# Patient Record
Sex: MALE | Employment: UNEMPLOYED | URBAN - METROPOLITAN AREA
[De-identification: names, ages, dates, MRNs, and addresses within clinical notes are randomized per-mention and may not be internally consistent; named-entity substitution may affect disease eponyms.]

---

## 2021-12-14 ENCOUNTER — HOSPITAL ENCOUNTER (INPATIENT)
Age: 31
LOS: 6 days | Discharge: HOME | DRG: 753 | End: 2021-12-20
Attending: EMERGENCY MEDICINE | Admitting: PSYCHIATRY & NEUROLOGY
Payer: MEDICARE

## 2021-12-14 DIAGNOSIS — R45.851 DEPRESSION WITH SUICIDAL IDEATION: Primary | ICD-10-CM

## 2021-12-14 DIAGNOSIS — F32.A DEPRESSION WITH SUICIDAL IDEATION: Primary | ICD-10-CM

## 2021-12-14 PROBLEM — F23 ACUTE PSYCHOSIS (HCC): Status: ACTIVE | Noted: 2021-12-14

## 2021-12-14 LAB
ABSOLUTE EOS #: 0.1 K/UL (ref 0–0.4)
ABSOLUTE IMMATURE GRANULOCYTE: ABNORMAL K/UL (ref 0–0.3)
ABSOLUTE LYMPH #: 1.8 K/UL (ref 1–4.8)
ABSOLUTE MONO #: 1.1 K/UL (ref 0.1–1.3)
ACETAMINOPHEN LEVEL: <5 UG/ML (ref 10–30)
ALBUMIN SERPL-MCNC: 4.3 G/DL (ref 3.5–5.2)
ALBUMIN/GLOBULIN RATIO: ABNORMAL (ref 1–2.5)
ALP BLD-CCNC: 69 U/L (ref 40–129)
ALT SERPL-CCNC: 76 U/L (ref 5–41)
AMPHETAMINE SCREEN URINE: POSITIVE
ANION GAP SERPL CALCULATED.3IONS-SCNC: 12 MMOL/L (ref 9–17)
AST SERPL-CCNC: 49 U/L
BARBITURATE SCREEN URINE: NEGATIVE
BASOPHILS # BLD: 1 % (ref 0–2)
BASOPHILS ABSOLUTE: 0 K/UL (ref 0–0.2)
BENZODIAZEPINE SCREEN, URINE: NEGATIVE
BILIRUB SERPL-MCNC: 0.76 MG/DL (ref 0.3–1.2)
BUN BLDV-MCNC: 13 MG/DL (ref 6–20)
BUN/CREAT BLD: ABNORMAL (ref 9–20)
BUPRENORPHINE URINE: ABNORMAL
CALCIUM SERPL-MCNC: 9 MG/DL (ref 8.6–10.4)
CANNABINOID SCREEN URINE: NEGATIVE
CHLORIDE BLD-SCNC: 100 MMOL/L (ref 98–107)
CO2: 24 MMOL/L (ref 20–31)
COCAINE METABOLITE, URINE: NEGATIVE
CREAT SERPL-MCNC: 0.76 MG/DL (ref 0.7–1.2)
DIFFERENTIAL TYPE: ABNORMAL
EOSINOPHILS RELATIVE PERCENT: 2 % (ref 0–4)
ETHANOL PERCENT: <0.01 %
ETHANOL: <10 MG/DL
GFR AFRICAN AMERICAN: >60 ML/MIN
GFR NON-AFRICAN AMERICAN: >60 ML/MIN
GFR SERPL CREATININE-BSD FRML MDRD: ABNORMAL ML/MIN/{1.73_M2}
GFR SERPL CREATININE-BSD FRML MDRD: ABNORMAL ML/MIN/{1.73_M2}
GLUCOSE BLD-MCNC: 106 MG/DL (ref 70–99)
HCT VFR BLD CALC: 41.8 % (ref 41–53)
HEMOGLOBIN: 14.8 G/DL (ref 13.5–17.5)
IMMATURE GRANULOCYTES: ABNORMAL %
LYMPHOCYTES # BLD: 23 % (ref 24–44)
MCH RBC QN AUTO: 29.6 PG (ref 26–34)
MCHC RBC AUTO-ENTMCNC: 35.4 G/DL (ref 31–37)
MCV RBC AUTO: 83.5 FL (ref 80–100)
MDMA URINE: ABNORMAL
METHADONE SCREEN, URINE: NEGATIVE
METHAMPHETAMINE, URINE: ABNORMAL
MONOCYTES # BLD: 15 % (ref 1–7)
NRBC AUTOMATED: ABNORMAL PER 100 WBC
OPIATES, URINE: NEGATIVE
OXYCODONE SCREEN URINE: NEGATIVE
PDW BLD-RTO: 13.1 % (ref 11.5–14.9)
PHENCYCLIDINE, URINE: NEGATIVE
PLATELET # BLD: 188 K/UL (ref 150–450)
PLATELET ESTIMATE: ABNORMAL
PMV BLD AUTO: 8.1 FL (ref 6–12)
POTASSIUM SERPL-SCNC: 3.7 MMOL/L (ref 3.7–5.3)
PROPOXYPHENE, URINE: ABNORMAL
RBC # BLD: 5.01 M/UL (ref 4.5–5.9)
RBC # BLD: ABNORMAL 10*6/UL
SALICYLATE LEVEL: <1 MG/DL (ref 3–10)
SARS-COV-2, RAPID: NOT DETECTED
SEG NEUTROPHILS: 59 % (ref 36–66)
SEGMENTED NEUTROPHILS ABSOLUTE COUNT: 4.6 K/UL (ref 1.3–9.1)
SODIUM BLD-SCNC: 136 MMOL/L (ref 135–144)
SPECIMEN DESCRIPTION: NORMAL
TEST INFORMATION: ABNORMAL
TOTAL PROTEIN: 7.2 G/DL (ref 6.4–8.3)
TRICYCLIC ANTIDEPRESSANTS, UR: ABNORMAL
WBC # BLD: 7.6 K/UL (ref 3.5–11)
WBC # BLD: ABNORMAL 10*3/UL

## 2021-12-14 PROCEDURE — 36415 COLL VENOUS BLD VENIPUNCTURE: CPT

## 2021-12-14 PROCEDURE — 80179 DRUG ASSAY SALICYLATE: CPT

## 2021-12-14 PROCEDURE — 87635 SARS-COV-2 COVID-19 AMP PRB: CPT

## 2021-12-14 PROCEDURE — 80307 DRUG TEST PRSMV CHEM ANLYZR: CPT

## 2021-12-14 PROCEDURE — 85025 COMPLETE CBC W/AUTO DIFF WBC: CPT

## 2021-12-14 PROCEDURE — 80053 COMPREHEN METABOLIC PANEL: CPT

## 2021-12-14 PROCEDURE — G0480 DRUG TEST DEF 1-7 CLASSES: HCPCS

## 2021-12-14 PROCEDURE — 99283 EMERGENCY DEPT VISIT LOW MDM: CPT

## 2021-12-14 PROCEDURE — 80143 DRUG ASSAY ACETAMINOPHEN: CPT

## 2021-12-14 PROCEDURE — 1240000000 HC EMOTIONAL WELLNESS R&B

## 2021-12-14 RX ORDER — POLYETHYLENE GLYCOL 3350 17 G/17G
17 POWDER, FOR SOLUTION ORAL DAILY PRN
Status: DISCONTINUED | OUTPATIENT
Start: 2021-12-14 | End: 2021-12-20 | Stop reason: HOSPADM

## 2021-12-14 RX ORDER — TRAZODONE HYDROCHLORIDE 50 MG/1
50 TABLET ORAL NIGHTLY PRN
Status: DISCONTINUED | OUTPATIENT
Start: 2021-12-15 | End: 2021-12-20 | Stop reason: HOSPADM

## 2021-12-14 RX ORDER — ACETAMINOPHEN 325 MG/1
650 TABLET ORAL EVERY 4 HOURS PRN
Status: DISCONTINUED | OUTPATIENT
Start: 2021-12-14 | End: 2021-12-20 | Stop reason: HOSPADM

## 2021-12-14 RX ORDER — LORAZEPAM 2 MG/ML
2 INJECTION INTRAMUSCULAR EVERY 4 HOURS PRN
Status: DISCONTINUED | OUTPATIENT
Start: 2021-12-14 | End: 2021-12-20 | Stop reason: HOSPADM

## 2021-12-14 RX ORDER — LORAZEPAM 1 MG/1
2 TABLET ORAL EVERY 4 HOURS PRN
Status: DISCONTINUED | OUTPATIENT
Start: 2021-12-14 | End: 2021-12-20 | Stop reason: HOSPADM

## 2021-12-14 RX ORDER — DIPHENHYDRAMINE HYDROCHLORIDE 50 MG/ML
50 INJECTION INTRAMUSCULAR; INTRAVENOUS EVERY 4 HOURS PRN
Status: DISCONTINUED | OUTPATIENT
Start: 2021-12-14 | End: 2021-12-20 | Stop reason: HOSPADM

## 2021-12-14 RX ORDER — HALOPERIDOL 5 MG/ML
5 INJECTION INTRAMUSCULAR EVERY 4 HOURS PRN
Status: DISCONTINUED | OUTPATIENT
Start: 2021-12-14 | End: 2021-12-20 | Stop reason: HOSPADM

## 2021-12-14 RX ORDER — HYDROXYZINE 50 MG/1
50 TABLET, FILM COATED ORAL 3 TIMES DAILY PRN
Status: DISCONTINUED | OUTPATIENT
Start: 2021-12-14 | End: 2021-12-20 | Stop reason: HOSPADM

## 2021-12-14 RX ORDER — HALOPERIDOL 5 MG
5 TABLET ORAL EVERY 4 HOURS PRN
Status: DISCONTINUED | OUTPATIENT
Start: 2021-12-14 | End: 2021-12-20 | Stop reason: HOSPADM

## 2021-12-14 RX ORDER — IBUPROFEN 400 MG/1
400 TABLET ORAL EVERY 6 HOURS PRN
Status: DISCONTINUED | OUTPATIENT
Start: 2021-12-14 | End: 2021-12-20 | Stop reason: HOSPADM

## 2021-12-14 RX ORDER — MAGNESIUM HYDROXIDE/ALUMINUM HYDROXICE/SIMETHICONE 120; 1200; 1200 MG/30ML; MG/30ML; MG/30ML
30 SUSPENSION ORAL EVERY 6 HOURS PRN
Status: DISCONTINUED | OUTPATIENT
Start: 2021-12-14 | End: 2021-12-20 | Stop reason: HOSPADM

## 2021-12-14 ASSESSMENT — ENCOUNTER SYMPTOMS
VOMITING: 0
NAUSEA: 0
ABDOMINAL PAIN: 0
BACK PAIN: 0
COUGH: 0

## 2021-12-14 ASSESSMENT — SLEEP AND FATIGUE QUESTIONNAIRES
DIFFICULTY FALLING ASLEEP: YES
DO YOU HAVE DIFFICULTY SLEEPING: YES
RESTFUL SLEEP: NO
DIFFICULTY STAYING ASLEEP: YES
AVERAGE NUMBER OF SLEEP HOURS: 0
DO YOU USE A SLEEP AID: NO
DIFFICULTY ARISING: YES
SLEEP PATTERN: DIFFICULTY FALLING ASLEEP

## 2021-12-14 ASSESSMENT — PATIENT HEALTH QUESTIONNAIRE - PHQ9: SUM OF ALL RESPONSES TO PHQ QUESTIONS 1-9: 9

## 2021-12-14 ASSESSMENT — LIFESTYLE VARIABLES: HISTORY_ALCOHOL_USE: YES

## 2021-12-15 PROBLEM — F43.10 PTSD (POST-TRAUMATIC STRESS DISORDER): Status: ACTIVE | Noted: 2021-12-15

## 2021-12-15 PROBLEM — F19.10 POLYSUBSTANCE ABUSE (HCC): Status: ACTIVE | Noted: 2021-12-15

## 2021-12-15 PROCEDURE — 1240000000 HC EMOTIONAL WELLNESS R&B

## 2021-12-15 PROCEDURE — 6370000000 HC RX 637 (ALT 250 FOR IP): Performed by: PSYCHIATRY & NEUROLOGY

## 2021-12-15 PROCEDURE — 6370000000 HC RX 637 (ALT 250 FOR IP): Performed by: NURSE PRACTITIONER

## 2021-12-15 PROCEDURE — 99223 1ST HOSP IP/OBS HIGH 75: CPT | Performed by: PSYCHIATRY & NEUROLOGY

## 2021-12-15 RX ORDER — LOPERAMIDE HYDROCHLORIDE 2 MG/1
2 CAPSULE ORAL 4 TIMES DAILY PRN
Status: DISCONTINUED | OUTPATIENT
Start: 2021-12-15 | End: 2021-12-20 | Stop reason: HOSPADM

## 2021-12-15 RX ORDER — TIZANIDINE 4 MG/1
4 TABLET ORAL EVERY 8 HOURS PRN
Status: DISCONTINUED | OUTPATIENT
Start: 2021-12-15 | End: 2021-12-20 | Stop reason: HOSPADM

## 2021-12-15 RX ORDER — CLONIDINE HYDROCHLORIDE 0.1 MG/1
0.1 TABLET ORAL 3 TIMES DAILY PRN
Status: DISCONTINUED | OUTPATIENT
Start: 2021-12-15 | End: 2021-12-20 | Stop reason: HOSPADM

## 2021-12-15 RX ORDER — OLANZAPINE 10 MG/1
10 TABLET ORAL NIGHTLY
Status: DISCONTINUED | OUTPATIENT
Start: 2021-12-15 | End: 2021-12-18

## 2021-12-15 RX ORDER — ONDANSETRON 4 MG/1
4 TABLET, ORALLY DISINTEGRATING ORAL EVERY 8 HOURS PRN
Status: DISCONTINUED | OUTPATIENT
Start: 2021-12-15 | End: 2021-12-20 | Stop reason: HOSPADM

## 2021-12-15 RX ORDER — DICYCLOMINE HYDROCHLORIDE 10 MG/1
10 CAPSULE ORAL 3 TIMES DAILY PRN
Status: DISCONTINUED | OUTPATIENT
Start: 2021-12-15 | End: 2021-12-20 | Stop reason: HOSPADM

## 2021-12-15 RX ADMIN — TIZANIDINE 4 MG: 4 TABLET ORAL at 20:58

## 2021-12-15 RX ADMIN — IBUPROFEN 400 MG: 400 TABLET, FILM COATED ORAL at 16:10

## 2021-12-15 RX ADMIN — CLONIDINE HYDROCHLORIDE 0.1 MG: 0.1 TABLET ORAL at 16:10

## 2021-12-15 RX ADMIN — TRAZODONE HYDROCHLORIDE 50 MG: 50 TABLET ORAL at 20:58

## 2021-12-15 RX ADMIN — HYDROXYZINE HYDROCHLORIDE 50 MG: 50 TABLET, FILM COATED ORAL at 08:50

## 2021-12-15 RX ADMIN — NICOTINE POLACRILEX 2 MG: 2 GUM, CHEWING BUCCAL at 21:49

## 2021-12-15 RX ADMIN — OLANZAPINE 10 MG: 10 TABLET, FILM COATED ORAL at 20:58

## 2021-12-15 RX ADMIN — IBUPROFEN 400 MG: 400 TABLET, FILM COATED ORAL at 08:49

## 2021-12-15 RX ADMIN — DICYCLOMINE HYDROCHLORIDE 10 MG: 10 CAPSULE ORAL at 20:58

## 2021-12-15 RX ADMIN — HYDROXYZINE HYDROCHLORIDE 50 MG: 50 TABLET, FILM COATED ORAL at 20:58

## 2021-12-15 ASSESSMENT — PAIN SCALES - GENERAL
PAINLEVEL_OUTOF10: 9
PAINLEVEL_OUTOF10: 9

## 2021-12-15 ASSESSMENT — PAIN DESCRIPTION - LOCATION: LOCATION: ARM

## 2021-12-15 ASSESSMENT — LIFESTYLE VARIABLES: HISTORY_ALCOHOL_USE: YES

## 2021-12-15 ASSESSMENT — PAIN DESCRIPTION - DESCRIPTORS: DESCRIPTORS: ACHING

## 2021-12-15 ASSESSMENT — PAIN DESCRIPTION - PAIN TYPE: TYPE: ACUTE PAIN

## 2021-12-15 NOTE — PLAN OF CARE
Problem: Tobacco Use:  Goal: Inpatient tobacco use cessation counseling participation  Description: Inpatient tobacco use cessation counseling participation  12/15/2021 1704 by Renee Grimaldo LPN  Outcome: Ongoing   Patient has Nicorette Gum to control tobacco cravings   Problem: Altered Mood, Depressive Behavior:  Goal: Ability to disclose and discuss suicidal ideas will improve  Description: Ability to disclose and discuss suicidal ideas will improve  12/15/2021 1704 by Renee Grimaldo LPN  Outcome: Ongoing   [facility] is seen in his room, affect is flat reports fleeting suicidal thoughts but agrees to be safe on the unit. Reports negative thoughts, and some anxiety and depression. Complains of withdrawal symptoms, tremors, hot/cold.  Took meds

## 2021-12-15 NOTE — BH NOTE
[facility]  Admission Note     Admission Type:   Admission Type: Voluntary    Reason for admission:  Reason for Admission: Patient states he has suicidal ideations with no plan, auditory hallucinations commanding to do thing but will not expand on this. Has not slept in a week. PATIENT STRENGTHS:  Strengths: Communication, No significant Physical Illness, Motivated    Patient Strengths and Limitations:  Limitations: Inappropriate/potentially harmful leisure interests    Addictive Behavior:   Addictive Behavior  In the past 3 months, have you felt or has someone told you that you have a problem with:  : None  Do you have a history of Chemical Use?: Yes  Do you have a history of Alcohol Use?: Yes  Do you have a history of Street Drug Abuse?: No  Histroy of Prescripton Drug Abuse?: No    Medical Problems:   Past Medical History:   Diagnosis Date    B[facility] disorder ([facility]     Depression     [facility]        Status EXAM:  Status and Exam  Normal: No  Facial Expression: Flat  Affect: Blunt  Level of Consciousness: Alert  Mood:Normal: No  Mood: Depressed, Anxious  Motor Activity:Normal: Yes  Interview Behavior: Cooperative  Preception: [place] to Person, [place] to Time, [place] to Place, [place] to Situation  Attention:Normal: No  Attention: Distractible  Thought Processes: Circumstantial  Thought Content:Normal: No  Thought Content: Preoccupations  Hallucinations:  Auditory (Comment)  Delusions: No  Memory:Normal: No  Memory: Poor Recent, Poor Remote  Insight and Judgment: No  Insight and Judgment: Poor Judgment, Poor Insight  Present Suicidal Ideation: Yes (no plan, contracts)  Present Homicidal Ideation: No    Tobacco Screening:  Practical Counseling, on admission, gabriela X, if applicable and completed (first 3 are required if patient doesn't refuse):            ( )  Recognizing danger situations (included triggers and roadblocks)                    ( )  Coping skills (new ways to manage stress, exercise, relaxation techniques, changing routine, distraction)                                                           ( )  Basic information about quitting (benefits of quitting, techniques in how to quit, available resources  ( ) Referral for counseling faxed to [facility]                                           ( ) Patient refused counseling  ( ) Patient has not smoked in the last 30 days    Metabolic Screening:    No results found for: LABA1C    No results found for: CHOL  No results found for: TRIG  No results found for: HDL  No components found for: LDLCAL  No results found for: LABVLDL      Body mass index is 27.12 kg/m². BP Readings from Last 2 Encounters:   12/14/21 132/84           Pt admitted with followings belongings:  Dental Appliances: None  Vision - Corrective Lenses: Glasses  Hearing Aid: None  Jewelry: None  Body Piercings Removed: N/A  Clothing: Footwear, Pants, Shirt, Socks  Were All Patient Medications Collected?: Not Applicable  Other Valuables: Other (Comment)     . Patient oriented to surroundings and program expectations and copy of patient rights given. Received admission packet. Consents reviewed, signed . Patient verbalized understanding. Patient education on precautions. Patient states suicidal ideations with no plan, contracts for safety. State auditory hallucinations but did not want to go into detail. States he has not slept in a week, has been using \"everything, meth, cocaine\". States he drinks \"maybe two beers every so often\". 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 States he has felt overwhelmed since he was released from [facility] two weeks ago. Doesn't know where to go as he cannot go back home. Patient reports poor supper system. Has not been on medications in two years. Patient admits to having PTSD from being in [facility].                    Cynthia Braswell RN

## 2021-12-15 NOTE — GROUP NOTE
Group Therapy Note    Date: 12/15/2021    Group Start Time: 1000  Group End Time: 1030  Group Topic: Psychotherapy    MANAN Brown        Group Therapy Note           Patient refused to attend psychotherapy group after encouragement from staff. 1:1 talk time offered but refused. PSignature:   Libby Brown

## 2021-12-15 NOTE — ED PROVIDER NOTES
[address]      Pt Name: Guanaco London  MRN: [id number]  [place] 1990  Date of evaluation: 12/14/21      CHIEF COMPLAINT       Chief Complaint   Patient presents with    Suicidal     pt transported to [place] [address] at arrival of unit. HISTORY OF PRESENT ILLNESS   HPI 28 y.o. male presents with c/o suicidal thoughts. The patient reports he was brought to the emergency department by a friend. The patient reports feeling depressed and having thought of suicide for the last month since he got out of [facility]. The patient has been thinking of overdosing on drugs. The patient reports a h/o of previous suicide attempt. The patient reports that their symptoms were provoked by difficulties after getting out of [facility] (reports he was in [facility] for the last 4 years) and being off his psychiatric medications. The patient does have a h/o of previous psychiatric admission. The patient reports drug use, meth and \"anything I can get my hands on\". He has attempted to overdose, \"but I keep waking up\". Last attempt was 2 days ago. REVIEW OF SYSTEMS     Review of Systems   Constitutional: Negative for fever. HENT: Negative for congestion. Eyes: Negative for visual disturbance. Respiratory: Negative for cough. Cardiovascular: Negative for chest pain. Gastrointestinal: Negative for abdominal pain, nausea and vomiting. Genitourinary: Negative for difficulty urinating. Musculoskeletal: Negative for back pain. Skin: Negative for rash. Neurological: Negative for headaches. Hematological: Negative for adenopathy. Psychiatric/Behavioral: Positive for dysphoric mood, sleep disturbance and suicidal ideas. PAST MEDICAL HISTORY     Past Medical History:   Diagnosis Date    [facility]     Depression     [facility]        SURGICAL HISTORY     History reviewed. No pertinent surgical history.     CURRENT MEDICATIONS       Current Discharge Medication List      CONTINUE these medications which have NOT CHANGED    Details   OLANZapine (ZYPREXA) 10 MG tablet Take 1 tablet by mouth nightly  Qty: 30 tablet, Refills: 0             ALLERGIES     has No Known Allergies. FAMILY HISTORY     He indicated that the status of his father is unknown. He indicated that the status of his other is unknown. SOCIAL HISTORY      reports that he has been smoking. His smokeless tobacco use includes chew. He reports current alcohol use. He reports current drug use. Drugs: IV and Marijuana ([facility]). PHYSICAL EXAM     INITIAL VITALS: /84   Pulse 82   Temp 98 °F (36.7 °C) (Oral)   Resp 14   Ht 6' (1.829 m)   Wt 200 lb (90.7 kg)   SpO2 100%   BMI 27.12 kg/m²   Gen: Tearful  Head: Normocephalic, atraumatic  Eye: Pupils equal round reactive to light, no conjunctivitis  Heart: Regular rate and rhythm no murmurs  Lungs: Clear to auscultation bilaterally, no respiratory distress  Chest wall: No crepitus, no tenderness palpation  Abdomen: Soft, nontender, nondistended, with no peritoneal signs  Skin: No diaphoresis. no lacerations. Neurologic: Patient is alert and oriented x3, motor and sensation is intact in all 4 extremities, speech is fluent  Extremities: Full range of motion, no cyanosis, no edema, no signs of trauma, no tenderness to palpation    MEDICAL DECISION MAKING:     MDM    28 y.o. male with depression, suicidal thoughts, suicidal plan. previous suicide attempt. The patient does not appear intoxicated. The patient is showing no signs of any acute active toxidrome. We'll screen for any acetaminophen or salicylate ingestion, we'll check baseline renal function, liver function, a drug screen, cbc and reassess. Emergency Department course:    Laboratory studies reviewed and unremarkable. Patient is medically clear for psychiatric evaluation.    will discuss the case with the psychiatry service, anticipate inpatient psychiatric admission. DIAGNOSTIC RESULTS     LABS: All lab results were reviewed by myself, and all abnormals are listed below.   Labs Reviewed   CBC WITH AUTO DIFFERENTIAL - Abnormal; Notable for the following components:       Result Value    Lymphocytes 23 (*)     Monocytes 15 (*)     All other components within normal limits   COMPREHENSIVE METABOLIC PANEL - Abnormal; Notable for the following components:    Glucose 106 (*)     ALT 76 (*)     AST 49 (*)     All other components within normal limits   URINE DRUG SCREEN - Abnormal; Notable for the following components:    Amphetamine Screen, Ur POSITIVE (*)     All other components within normal limits   ACETAMINOPHEN LEVEL - Abnormal; Notable for the following components:    Acetaminophen Level <5 (*)     All other components within normal limits   SALICYLATE LEVEL - Abnormal; Notable for the following components:    Salicylate Lvl <1 (*)     All other components within normal limits   COVID-19, RAPID   ETHANOL       EMERGENCY DEPARTMENT COURSE:   Vitals:    Vitals:    12/14/21 2005 12/14/21 2324   BP: 126/79 132/84   Pulse: 110 82   Resp: 19 14   Temp: 98.1 °F (36.7 °C) 98 °F (36.7 °C)   TempSrc:  Oral   SpO2: 97% 100%   Weight: 200 lb (90.7 kg) 200 lb (90.7 kg)   Height: 6' (1.829 m) 6' (1.829 m)       The patient was given the following medications while in the emergency department:  Orders Placed This Encounter   Medications    acetaminophen (TYLENOL) tablet 650 mg    aluminum & magnesium hydroxide-simethicone (MAALOX) 200-200-20 MG/5ML suspension 30 mL    hydrOXYzine (ATARAX) tablet 50 mg    ibuprofen (ADVIL;MOTRIN) tablet 400 mg    nicotine polacrilex (NICORETTE) gum 2 mg    polyethylene glycol (GLYCOLAX) packet 17 g    traZODone (DESYREL) tablet 50 mg    AND Linked Order Group     haloperidol lactate (HALDOL) injection 5 mg     LORazepam (ATIVAN) injection 2 mg     diphenhydrAMINE (BENADRYL) injection 50 mg    AND Linked Order Group     haloperidol (HALDOL) tablet 5 mg     LORazepam (ATIVAN) tablet 2 mg    influenza quadrivalent split vaccine (FLUZONE;FLUARIX;FLULAVAL;AFLURIA) injection 0.5 mL     -------------------------  CRITICAL CARE:   CONSULTS: None  PROCEDURES: Procedures     FINAL IMPRESSION      1. Depression with suicidal ideation          DISPOSITION/PLAN   DISPOSITION Decision To Admit 12/15/2021 06:27:36 AM      PATIENT REFERRED TO:  No follow-up provider specified.     DISCHARGE MEDICATIONS:  Current Discharge Medication List            Caesar Rm MD  Attending Emergency Physician                     Caesar Rm MD  12/15/21 4787

## 2021-12-15 NOTE — PROGRESS NOTES
Behavioral Services  Medicare Certification Upon Admission    I certify that this patient's inpatient psychiatric hospital admission is medically necessary for:    [x] (1) Treatment which could reasonably be expected to improve this patient's condition,       [x] (2) Or for diagnostic study;     AND     [x](2) The inpatient psychiatric services are provided while the individual is under the care of a physician and are included in the individualized plan of care.     Estimated length of stay/service 5 to 9 days    Plan for post-hospital care -outpatient care    Electronically signed by Alla Agarwal MD on 12/15/2021 at 11:13 AM

## 2021-12-15 NOTE — GROUP NOTE
Group Therapy Note    Date: 12/15/2021    Group Start Time: 1100  Group End Time: 1130  Group Topic: Cognitive Skills    MANAN Nieto, CTRS    Pt did not attend 1100 cognitive skills group d/t resting in room despite staff invitation to attend. 1:1 talk time offered as alternative to group session, pt declined.               Signature:  Giancarlo Moulton

## 2021-12-15 NOTE — PLAN OF CARE
[facility]  Initial Interdisciplinary Treatment Plan NO      Original treatment plan Date & Time: 12/15/2021 0748    Admission Type:  Admission Type: Voluntary    Reason for admission:   Reason for Admission: Patient states he has suicidal ideations with no plan, auditory hallucinations commanding to do thing but will not expand on this. Has not slept in a week. Estimated Length of Stay:  5-7days  Estimated Discharge Date: to be determined by physician    PATIENT STRENGTHS:  Patient Strengths:Strengths: Communication, No significant Physical Illness, Motivated  Patient Strengths and Limitations:Limitations: Inappropriate/potentially harmful leisure interests  Addictive Behavior: Addictive Behavior  In the past 3 months, have you felt or has someone told you that you have a problem with:  : None  Do you have a history of Chemical Use?: Yes  Do you have a history of Alcohol Use?: Yes  Do you have a history of Street Drug Abuse?: No  Histroy of Prescripton Drug Abuse?: No  Medical Problems:  Past Medical History:   Diagnosis Date    [facility]     Depression     Substance abuse ([facility]      Status EXAM:Status and Exam  Normal: No  Facial Expression: Flat  Affect: Blunt  Level of Consciousness: Alert  Mood:Normal: No  Mood: Depressed, Anxious  Motor Activity:Normal: Yes  Interview Behavior: Cooperative  Preception: [place] to Person, [place] Put[place] to Time, [place] to Place, [place] to Situation  Attention:Normal: No  Attention: Distractible  Thought Processes: Circumstantial  Thought Content:Normal: No  Thought Content: Preoccupations  Hallucinations:  Auditory (Comment)  Delusions: No  Memory:Normal: No  Memory: Poor Recent, Poor Remote  Insight and Judgment: No  Insight and Judgment: Poor Judgment, Poor Insight  Present Suicidal Ideation: Yes (no plan, contracts)  Present Homicidal Ideation: No    EDUCATION:   Learner Progress Toward Treatment Goals: reviewed group plans and strategies for care    Method:group therapy, medication compliance, individualized assessments and care planning    Outcome: needs reinforcement    PATIENT GOALS: to be discussed with patient within 72 hours    PLAN/TREATMENT RECOMMENDATIONS:     continue group therapy , medications compliance, goal setting, individualized assessments and care, continue to monitor pt on unit      SHORT-TERM GOALS:   Time frame for Short-Term Goals: 5-7 days    LONG-TERM GOALS:  Time frame for Long-Term Goals: 6 months  Members Present in Team Meeting: See [address], CTRS 12/15/2021+td

## 2021-12-15 NOTE — BH NOTE
On call provider, Dr. Deuce Polanco, notified of best practice advisory suggesting to place patient on suicide precautions. Provider to discontinue the order as patient does not meet criteria for suicide precautions at this time. Continue to observe patient on every 15 minute checks.

## 2021-12-15 NOTE — ED NOTES
Provisional Diagnosis:  Acute psychosis     Psychosocial and Contextual Factors: Pt has issues with social enviroment. Pt has issues with relationships. Pt has legal issues. Pt has substance abuse issues. C-SSRS Summary:    Patient: X    Family:     Agency: X (EPIC)    Present Suicidal Behavior:     Verbal: X    Attempt:     Past Suicidal Behavior:     Verbal: X    Attempt: X    Self- Injurious/ Self-Mutilation:  Pt denies    Trauma History:     Protective Factors: Pt has insurance. Pt has support. Risk Factors:Pt has poor coping skills. Pt has no housing. Substance Abuse: Meth and opiates. Clinical Summary:  Kathryne Holter is a 28year old male who presents to the ED via self. Pt is suicidal. Pt reports \"there are a million different ways. \" Pt reports pt has been trying to commit suicide over the past two weeks by intentionally overdosing on illegal drugs and walking out into traffic. Pt identifies being released from long-term to a [facility] house a month ago, not being able to return home, and having no where to go as the trigger to pt's SI. Pt has a history of suicidal ideations. Pt reports vague homicidal ideations without a plan or intent to harm anyone. Pt hears voices that tell pt to go \"good and bad things at times. \" Pt has a previous diagnosis of bipolar disorder and schizophrenia. Pt has been off medications for 2 years. Pt is not linked. Pt has no previous admissions to the [facility]. Pt has been abusing \"anyting I can get my hands on\" since pt was released from long-term. Pt used meth 2 days ago and \"opium\" around midnight. Pt reports a not sleeping for the past 2 days and a decrease in pt's appetite. Level of Care Disposition:.CHERIE consulted with Camilo Bhandari from psychiatry. Pt accepted for an inpatient admission to the [facility] for safety and stabilization.

## 2021-12-15 NOTE — BH NOTE
Patient request Atarax 50 mg po for increased anxiety, patient was cooperative with 1;1 talk time, currently resting in bed

## 2021-12-15 NOTE — CARE COORDINATION
[facility] Biopsychosocial Assessment    Current Level of Psychosocial Functioning     Independent   Dependent  X  Minimal Assist     Psychosocial High Risk Factors (check all that apply)    Unable to obtain meds   Chronic illness/pain    Substance abuse X Methamphetamines, Opiates   Lack of Family Support   Financial stress   Isolation X  Inadequate Community ResourcesX  Suicide attempt(s) X  Not taking medications X  Victim of crime   Developmental Delay  Unable to manage personal needs  X  Age 72 or older   Homeless   No transportation   Readmission within 30 days  Unemployment  Traumatic Event    Psychiatric Advanced Directives: none reported     Family to Involve in Treatment: Pt reports that his mother and father are supportive and involved in his care. Sexual Orientation: YAZ    Patient Strengths: insurance, family support     Patient Barriers: substance abuse, legal involvement, presenting on admission with suicidal ideation to Overdose on drugs. , not linked with [facility]. Opiate Education Provided: Pt was provided with Opiate addiction and relapse information. Pt reports recent Opiate and Methamphetamine use. Pt drug screen was positive for Methamphetamines upon admission. CMHC/mental health history: Pt is not currently linked with a [facility], He reports that he is not currently on any Psychiatric medications, Pt lives in Psychiatric hospital. He reports that he is currently on [place] after being released from a 4 year custodial sentence in [address] [place] [address]. He reports that he was [facility] [place], because he was told by his  that he is unable to return home. Plan of Care   medication management, group/individual therapies, family meetings, psycho -education, treatment team meetings to assist with stabilization, referral to community resources. Initial Discharge Plan:   To Be Determined, Pt is currently on a police [place] through [address] [place] [address] Summary:  Radha Cheng is a 28year old male who presents on admission with suicidal ideation with a plan to overdose. Pt reports he has been trying to commit suicide over the past two weeks by intentionally overdosing on illegal drugs and walking out into traffic. Pt identifies being released from [facility] to a senior living house, not being able to return home, and having no where to go as the trigger to pt's suicidal ideation. Pt has a history of suicidal ideations. \" Pt has a previous diagnosis of bipolar disorder and schizophrenia. Pt is not currently linked with a [facility], that he has been off has been off medications for 2 years. Pt lives in [facility]. He reports that he is currently on [place] after being released from a 4 year [facility] sentence in [place]. He reports that he was released to a senior living house, because he was told by his  that he is unable to return home. Pt reports that he used meth 2 days ago and \"opium\" around midnight. Pt reports a not sleeping for the past 2 days and a decrease in pt's appetite.

## 2021-12-15 NOTE — H&P
Department of Psychiatry  Attending Physician Psychiatric Assessment     Reason for Admission to Psychiatric Unit:    Threat to self requiring 24 hour professional observation  Command hallucinations directing harm to self or others; risk of the patient taking action  A mental disorder causing major disability in social, interpersonal, occupational, and/or educational functioning that is leading to dangerous or life-threatening functioning, and that can only be addressed in an acute inpatient setting   Concerns about patient's safety in the community    CHIEF COMPLAINT: Depression with suicidal ideation    History obtained from: Patient, electronic medical record          HISTORY OF PRESENT ILLNESS:    Asmita Bassett is a 28 y.o. male who has a past medical history of bipolar, depression, substance abuse. Patient presented to the [facility] ED with the report of suicidal ideation. According to ED documentation: The patient reports he was brought to the emergency department by a friend. The patient reports feeling depressed and having thought of suicide for the last month since he got out of custodial. The patient has been thinking of overdosing on drugs. The patient reports a h/o of previous suicide attempt. The patient reports that their symptoms were provoked by difficulties after getting out of custodial (reports he was in correction for the last 4 years) and being off his psychiatric medications. The patient does have a h/o of previous psychiatric admission. The patient reports drug use, meth and \"anything I can get my hands on\". He has attempted to overdose, \"but I keep waking up\". Last attempt was 2 days ago. Josafat Joann is interviewed today bedside, he endorses a low mood for greater than 2 weeks, difficulty sleeping, feelings of guilt and worthlessness, poor energy, poor focus and concentration, decreased appetite, feelings of hopelessness and suicidal thoughts.   He reports that he is attempted to overdose multiple times and also tried to hang himself since November 18. He reports that since being released from jail he walked away from the care home house after 2 days. He reports that this is a violation but that he \"did his time for that after being rearrested and released from snf on Tuesday night. He reports that just prior to this hospitalization he was in a car and states \"they tried to rhona me\", he reports that he jumped out of the vehicle and the car door slammed back on his left wrist.  He endorses pain and decreased range of motion to the left wrist.  In addition to his feelings of depression he endorses some anxiety and irritability. He reports that the irritability is mostly related to drug withdrawal.  He reports that he has not slept for approximately 1 week though reports that he has been consistently using methamphetamine and cocaine throughout that week. He does endorse a history of jono without the presence of illicit substances once in his adult life. He also endorses symptoms of psychosis, he reports auditory hallucinations, he reports that sometimes they are command in nature telling him to hurt himself and others though he denies that they are command at present. He endorses a history of paranoia and also feeling that the television is talking to or about him at times. He reports occasional panic though states that he normally \"uses drugs or rides it out\". Rosita Loaiza also endorses a history of trauma, he reports that he experienced \"a lot of really bad things in jail\". He reports that he has difficulty sleeping because of it, he endorses hypervigilance, he is easily startled and does not like being around crowds. He reports that his parents are supportive however he does not identify them as protective factors. He describes his withdrawal symptoms at present as \"my bones hurt and my skin is crawling\". He also endorses some nausea.          History of head trauma: [] Yes [x] No    History of seizures: [] Yes [x] No    History of violence or aggression: [x] Yes [] No         PSYCHIATRIC HISTORY:  [x] Yes [] No    Currently follows with no one, recently released from intermediate  Multiple lifetime suicide attempts, overdose and hanging  Multiple psychiatric hospital admissions, last [facility] admission was in 2016, at that time he did attempt to escape by jumping over the court yard wall    Home Medication Compliance: [] Yes [x] No    Past psychiatric medications includes: Olanzapine, Depakote, Seroquel, Prozac, Suboxone    Adverse reactions from psychotropic medications: [] Yes [x] No         Lifetime Psychiatric Review of Systems         Depression: Endorses     Anxiety: Endorses     Panic Attacks: Endorses     Dorie or Hypomania: Endorses history of     Phobias: Endorses (crowds)     Obsessions and Compulsions: Denies     Body or Vocal Tics: Denies     Visual Hallucinations: Denies     Auditory Hallucinations: Endorses     Delusions/Paranoia: Endorses     PTSD: Endorses    Past Medical History:        Diagnosis Date    [facility]     Depression     [facility]        Past Surgical History:    History reviewed. No pertinent surgical history. Allergies:  Patient has no known allergies. Social History:     Born in: [facility]  Family: Reports that he was raised by his biological parents and lived with 2 siblings. He reports that he has 2 sisters. He reports that his parents and sisters are supportive. He denies any abuse as a child.   Highest Level of Education: High school graduate  Occupation: Unemployed  Marital Status: Single  Children: None  Residence: Homeless  Stressors: Legal issues, drug addiction, unstable income, unstable housing  Patient Assets/Supportive Factors: Supportive family, believes SSI was approved prior to release from intermediate         [address] [place] [address] History     Tobacco Use   Smoking Status Current Every Day Smoker   Smokeless Tobacco Current User    Types: Chew   Tobacco Comment    refuses nicotine replacement     Social History     Substance and Sexual Activity   Alcohol Use Yes    Alcohol/week: 0.0 standard drinks    Comment: up to 12 Beers/days     Social History     Substance and Sexual Activity   Drug Use Yes    Types: IV, Marijuana (Weed)    Comment: Heroin       Endorses a long history of drug abuse. Reports recently that he has been trying to overdose on fentanyl, and using methamphetamine and cocaine. UDS is positive for amphetamine  EtOH level is less than 0.010         LEGAL HISTORY:   HISTORY OF INCARCERATION: [x] Yes [] No, currently on parole. Also has a police hold. He reports that he has spent a total of 10 years in halfway through 5 stents of incarceration and multiple local group home stays. He reports that it is drug related. Family History:       Problem Relation Age of Onset    Depression Other         uncle, commited suicide.  Alcohol Abuse Other         uncle    Alcohol Abuse Father        Psychiatric Family History  Patient endorses psychiatric family history. Maternal uncle was bipolar. Suicides in family: [] Yes [x] No    Substance use in family: [x] Yes [] No, maternal uncle fatally overdosed         PHYSICAL EXAM:  Vitals:  /65   Pulse 65   Temp 98 °F (36.7 °C) (Oral)   Resp 14   Ht 6' (1.829 m)   Wt 200 lb (90.7 kg)   SpO2 100%   BMI 27.12 kg/m²   Pain Level: 9/10    LABS:  Labs reviewed: [x] Yes  No recent EKG on file for review          Review of Systems   Constitutional: Endorses \"bone aches\"  HENT: Negative for ear pain and nosebleeds. Eyes: Negative for blurred vision and photophobia. Respiratory: Negative for cough, shortness of breath and wheezing. Cardiovascular: Negative for chest pain and palpitations. Gastrointestinal: Positive for nausea. Denies any abdominal pain or diarrhea. Genitourinary: Negative for dysuria and urgency.    Musculoskeletal: Positive left wrist pain secondary to injury. Skin: Negative for itching and rash. Positive \"skin crawling\"  Neurological: Negative for tremors, seizures and weakness. Endo/Heme/Allergies: Does not bruise/bleed easily. Physical Exam:   Constitutional:  Appears well-developed and well-nourished, no acute distress. HENT:   Head: Normocephalic and atraumatic. Eyes: Conjunctivae are normal. Right eye exhibits no discharge. Left eye exhibits no discharge. No scleral icterus. Neck: Normal range of motion. Neck supple. Pulmonary/Chest:  No respiratory distress or accessory muscle use, no wheezing. Cardiac: Regular rate and rhythm. Abdominal: Soft. Non-tender. Exhibits no distension. Musculoskeletal: Normal range of motion, with the exception of the left wrist.  No significant point tenderness. Exhibits no edema. Neurological: cranial nerves II-XII grossly in tact, normal gait and station. Skin: Skin is warm and dry. Patient is not diaphoretic. No erythema. Mental Status Examination:    Level of consciousness: Awake and alert  Appearance:  Appropriate attire, resting in bed, fair grooming   Behavior/Motor: Approachable, engages in interview  Attitude toward examiner:  Cooperative, attentive, poor eye contact  Speech: Normal rate, volume, and tone. Mood: Depressed  Affect:  Congruent  Thought processes:   Linear and coherent  Thought content: Active suicidal ideations, with a  current plan or intent, contracts for safety on the unit.                Denies homicidal ideations               Endorses auditory hallucinations              Denies delusions              Endorses paranoia  Cognition:  Oriented to self, location, time, situation  Concentration: Clinically adequate  Memory: Intact  Insight &Judgment: Poor         DSM-5 Diagnosis    Principal Problem: Bipolar disorder, curr episode depressed, severe, w/psychotic features ([facility]    PTSD  Polysubstance abuse    Psychosocial and Contextual factors:  Financial Occupational   Relationship   Legal   Living situation   Educational     Past Medical History:   Diagnosis Date    B[facility] disorder ([facility]     Depression     Substance abuse ([facility]         TREATMENT PLAN    Continue inpatient psychiatric treatment. Home medications reviewed. Start Zyprexa 10 mg nightly  Monitor for opiate withdrawal  Monitor need and frequency of PRN medications. Attempt to develop insight. Follow-up daily while inpatient. Reviewed risks and benefits as well as potential side effects with patient. CONSULTS [x] Yes [] No  Internal medicine for left wrist injury    Risk Management: close watch per standard protocol      Psychotherapy: participation in milieu and group and individual sessions with Attending Physician,  and Physician Assistant/CNP      Estimated length of stay:  2-14 days      GENERAL PATIENT/FAMILY EDUCATION  Patient will understand basic signs and symptoms, patient will understand benefits/risks and potential side effects from proposed medications, and patient will understand their role in recovery. Family is minimally active in patient's care. Patient assets that may be helpful during treatment include: Intent to participate and engage in treatment, sufficient fund of knowledge and intellect to understand and utilize treatments. Goals:    1) Remission of suicidal ideation. 2) Stabilization of symptoms prior to discharge. 3) Establish efficacy and tolerability of medications. Behavioral Services  Medicare Certification     Admission Day 1  I certify that this patient's inpatient psychiatric hospital admission is medically necessary for:    x (1) treatment which could reasonably be expected to improve this patient's condition, or    x (2) diagnostic study or its equivalent.      Time Spent: 60 minutes    Shilpi Silva is a 28 y.o. male being evaluated face to face    --JAZMINE Sterling CNP on 12/15/2021 at 3:38 PM    An electronic signature was used to authenticate this note. I independently saw and evaluated the patient. I reviewed the midlevel provider's documentation above. Any additional comments or changes to the midlevel provider's documentation are stated below otherwise agree with assessment. The patient was seen at bedside. He reports that he has been feeling suicidal for a while but has been getting worse. He also reports visual hallucinations and paranoia. He could not give a good description of his visual hallucinations. He believes they are like shadows but he cannot make out what they are. Next para the patient has been using methamphetamine. The patient has been admitted to psychiatry in the past and has attempted suicide before. He said this is his third admission. Patient has benefited from treatment from Zyprexa in the past and is willing to take this medication again. We will start Zyprexa 10 mg daily. PLAN  Medications as noted above  Attempt to develop insight  Psycho-education conducted. Supportive Therapy conducted. Probable discharge is 3-9 days.    Follow-up daily while on inpatient unit    Electronically signed by Tonja Carr MD on 12/15/21 at 5:44 PM EST

## 2021-12-15 NOTE — BH NOTE
Per staff report to this writer:   [facility] called and relayed that Mr. Melinda Yin has active warrants and is on parole. They have reportedly been looking for him and suspect he has been \"hospital hopping\" to avoid arrest. Reportedly  [address] [facility] [facility] contacted [facility] and notified them of his current location. Tanisha [place] Fernanda expressed interest in obtaining custody upon his discharge. Staff who took call transferred call to hospital security at the time of the call. Writer notified Social Work. [facility] contacted this writer and notified of new Azul status. Copy of Azul placed in Kasey Aruna chart.

## 2021-12-16 PROCEDURE — 6370000000 HC RX 637 (ALT 250 FOR IP): Performed by: NURSE PRACTITIONER

## 2021-12-16 PROCEDURE — 99232 SBSQ HOSP IP/OBS MODERATE 35: CPT | Performed by: PSYCHIATRY & NEUROLOGY

## 2021-12-16 PROCEDURE — APPSS30 APP SPLIT SHARED TIME 16-30 MINUTES: Performed by: NURSE PRACTITIONER

## 2021-12-16 PROCEDURE — 6360000002 HC RX W HCPCS: Performed by: PSYCHIATRY & NEUROLOGY

## 2021-12-16 PROCEDURE — 6370000000 HC RX 637 (ALT 250 FOR IP): Performed by: PSYCHIATRY & NEUROLOGY

## 2021-12-16 PROCEDURE — 1240000000 HC EMOTIONAL WELLNESS R&B

## 2021-12-16 RX ORDER — BUPRENORPHINE AND NALOXONE 2; .5 MG/1; MG/1
1 FILM, SOLUBLE BUCCAL; SUBLINGUAL 2 TIMES DAILY
Status: DISCONTINUED | OUTPATIENT
Start: 2021-12-16 | End: 2021-12-19

## 2021-12-16 RX ADMIN — BUPRENORPHINE AND NALOXONE 1 FILM: 2; .5 FILM BUCCAL; SUBLINGUAL at 14:38

## 2021-12-16 RX ADMIN — ONDANSETRON 4 MG: 4 TABLET, ORALLY DISINTEGRATING ORAL at 13:58

## 2021-12-16 RX ADMIN — DICYCLOMINE HYDROCHLORIDE 10 MG: 10 CAPSULE ORAL at 13:58

## 2021-12-16 RX ADMIN — TIZANIDINE 4 MG: 4 TABLET ORAL at 22:21

## 2021-12-16 RX ADMIN — OLANZAPINE 10 MG: 10 TABLET, FILM COATED ORAL at 20:19

## 2021-12-16 RX ADMIN — NICOTINE POLACRILEX 2 MG: 2 GUM, CHEWING BUCCAL at 20:57

## 2021-12-16 RX ADMIN — NICOTINE POLACRILEX 2 MG: 2 GUM, CHEWING BUCCAL at 11:19

## 2021-12-16 RX ADMIN — TIZANIDINE 4 MG: 4 TABLET ORAL at 13:59

## 2021-12-16 RX ADMIN — DICYCLOMINE HYDROCHLORIDE 10 MG: 10 CAPSULE ORAL at 20:19

## 2021-12-16 RX ADMIN — NICOTINE POLACRILEX 2 MG: 2 GUM, CHEWING BUCCAL at 13:56

## 2021-12-16 RX ADMIN — HYDROXYZINE HYDROCHLORIDE 50 MG: 50 TABLET, FILM COATED ORAL at 13:59

## 2021-12-16 RX ADMIN — CLONIDINE HYDROCHLORIDE 0.1 MG: 0.1 TABLET ORAL at 13:56

## 2021-12-16 RX ADMIN — BUPRENORPHINE AND NALOXONE 1 FILM: 2; .5 FILM BUCCAL; SUBLINGUAL at 20:19

## 2021-12-16 NOTE — PLAN OF CARE
[facility]  Day 3 Interdisciplinary Treatment Plan NOTE    Review Date & Time: 12/16/2021 7415    Admission Type:   Admission Type: Voluntary    Reason for admission:  Reason for Admission: Patient states he has suicidal ideations with no plan, auditory hallucinations commanding to do thing but will not expand on this. Has not slept in a week. Estimated Length of Stay: 5-7 days  Estimated Discharge Date Update: to be determined by physician    PATIENT STRENGTHS:  Patient Strengths Strengths: Communication, No significant Physical Illness, Motivated  Patient Strengths and Limitations:Limitations: Apathetic / unmotivated, Inappropriate/potentially harmful leisure interests, Multiple barriers to leisure interests, General negative or hopeless attitude about future/recovery  Addictive Behavior:Addictive Behavior  In the past 3 months, have you felt or has someone told you that you have a problem with:  : None  Do you have a history of Chemical Use?: Yes  Do you have a history of Alcohol Use?: Yes  Do you have a history of Street Drug Abuse?: Yes  Histroy of Prescripton Drug Abuse?: No  Medical Problems:  Past Medical History:   Diagnosis Date    Bipolar disorder ([facility] [address]     Depression     Substance abuse ([facility]        Risk:  Fall RiskTotal: 67  Avery Scale Avery Scale Score: 22  BVC    Change in scores no Changes to plan of Care no    Status EXAM:   Status and Exam  Normal: No  Facial Expression: Flat  Affect: Blunt  Level of Consciousness: Alert  Mood:Normal: No  Mood: Depressed, Anxious  Motor Activity:Normal: No  Motor Activity: Decreased  Interview Behavior: Cooperative  Preception: [place] to Person, [place] to Time, [place] to Place, [place] to Situation  Attention:Normal: No  Attention: Distractible  Thought Processes: Circumstantial  Thought Content:Normal: No  Thought Content: Preoccupations  Hallucinations:  Auditory (Comment)  Delusions: No  Memory:Normal: No  Memory: Poor Recent, Poor Remote  Insight and Judgment: No  Insight and Judgment: Poor Judgment, Poor Insight, Unmotivated  Present Suicidal Ideation: No  Present Homicidal Ideation: No    Daily Assessment Last Entry:   Daily Sleep (WDL): Within Defined Limits         Patient Currently in Pain: Denies  Daily Nutrition (WDL): Within Defined Limits    Patient Monitoring:  Frequency of Checks: 4 times per hour, close    Psychiatric Symptoms:   Depression Symptoms  Depression Symptoms: Loss of interest, Isolative  Anxiety Symptoms  Anxiety Symptoms: Generalized  Dorie Symptoms  Dorie Symptoms: No problems reported or observed. Psychosis Symptoms  Delusion Type: No problems reported or observed. Suicide Risk CSSR-S:  1) Within the past month, have you wished you were dead or wished you could go to sleep and not wake up? : Yes  2) Have you actually had any thoughts of killing yourself? : No  3) Have you been thinking about how you might kill yourself? : No  5) Have you started to work out or worked out the details of how to kill yourself?  Do you intend to carry out this plan? : No  6) Have you ever done anything, started to do anything, or prepared to do anything to end your life?: No  Change in Result no Change in Plan of care no      EDUCATION:   EDUCATION:   Learner Progress Toward Treatment Goals: Reviewed results and recommendations of this team, Reviewed group plan and strategies, Reviewed signs, symptoms and risk of self harm and violent behavior, Reviewed goals and plan of care    Method:small group, individual verbal education    Outcome:verbalized by patient, but needs reinforcement to obtain goals    PATIENT GOALS:  Short term: attending groups, med stabilization   Long term: get into rehab, get clean    PLAN/TREATMENT RECOMMENDATIONS UPDATE: continue with group therapies, increased socialization, continue planning for after discharge goals, continue with medication compliance    SHORT-TERM GOALS UPDATE:   Time frame for Short-Term Goals: 5-7 days    LONG-TERM GOALS UPDATE:   Time frame for Long-Term Goals: 6 months  Members Present in Team Meeting: See Signature Sheet    MAGGIE Cuellar

## 2021-12-16 NOTE — GROUP NOTE
Group Therapy Note    Date: 12/16/2021    Group Start Time: 5735  Group End Time: 9647  Group Topic: Recreational    [facility] D    Kitty Dk, CTRS    Pt did not attend [phone] recreation therapy group d/t resting in room despite staff invitation to attend. 1:1 talk time offered as alternative to group session, pt declined.             Signature:  Татьяна Lawson

## 2021-12-16 NOTE — PROGRESS NOTES
Daily Progress Note  12/16/2021    Patient Name: Yossi King: Depression with suicidal ideation         Emergency Medications: As required opiate withdrawal as needed clonidine 0.1 mg once today. Scheduled medications: Adherent    SUBJECTIVE:     Patient seen face-to-face for follow-up assessment. He is anxious. States that he is upset regarding requirement to discharge to an AOD program.  States \"I just cannot do it! I will kill myself if I have to go be with other drug addicts. I am going to get into a fight with one of them and go back to long term if I go back to one of those places. \"  States he would rather just go to long term and to serve his time. Denies specific plan to harm self on unit but is unable to contract for safety at a lower level of care. Provided emotional support. Attempted to engage patient in discussion regarding his agitation and coping skills that he can use when feeling on edge and explosive. Patient indifferent. States that nothing will be helpful. Patient was started on olanzapine 10 mg. Denies any side effects to this medication at this time. States that he felt he slept better overnight. Patient reports withdrawal symptoms from methamphetamine. He endorses dry mouth, increased anxiety and some episodes of jitteriness. Clonidine, Bentyl, hydroxyzine, Zofran, and Zanaflex available as needed for specific withdrawal symptoms.     Appetite:  [] Normal/Adequate/Unchanged  [x] Increased  [] Decreased      Sleep:       [] Normal/Adequate/Unchanged  [x] Fair  [] Poor      Group Attendance on Unit:   [] Yes  [] Selectively    [x] No    Medication Side Effects: Denies         Mental Status Exam  Level of consciousness: Alert and awake   Appearance: Appropriate attire for setting, seated in chair, with fair  grooming and hygiene   Behavior/Motor: Approachable, no psychomotor abnormalities   Attitude toward examiner: Cooperative, attentive, good eye contact Speech: spontaneous, normal rate, normal volume and well articulated   Mood:  \"anxious\"  Affect: Congruent  Thought processes: linear   Thought content: Denies homicidal ideation  Suicidal Ideation: Endorses suicidal ideation without a plan, able to contract for safety on unit  Delusions: Denies  Perceptual Disturbance: Denies. Does not appear to be responding to internal stimuli  Cognition: Oriented to self, location, time, and situation  Memory: intact  Insight & Judgement: poor     Data   height is 6' (1.829 m) and weight is 200 lb (90.7 kg). His oral temperature is 97.9 °F (36.6 °C). His blood pressure is 127/71 and his pulse is 60. His respiration is 14 and oxygen saturation is 100%.    Labs:   Admission on 12/14/2021   Component Date Value Ref Range Status    WBC 12/14/2021 7.6  3.5 - 11.0 k/uL Final    RBC 12/14/2021 5.01  4.5 - 5.9 m/uL Final    Hemoglobin 12/14/2021 14.8  13.5 - 17.5 g/dL Final    Hematocrit 12/14/2021 41.8  41 - 53 % Final    MCV 12/14/2021 83.5  80 - 100 fL Final    MCH 12/14/2021 29.6  26 - 34 pg Final    MCHC 12/14/2021 35.4  31 - 37 g/dL Final    RDW 12/14/2021 13.1  11.5 - 14.9 % Final    Platelets 37/93/1695 188  150 - 450 k/uL Final    MPV 12/14/2021 8.1  6.0 - 12.0 fL Final    NRBC Automated 12/14/2021 NOT REPORTED  per 100 WBC Final    Differential Type 12/14/2021 NOT REPORTED   Final    Seg Neutrophils 12/14/2021 59  36 - 66 % Final    Lymphocytes 12/14/2021 23* 24 - 44 % Final    Monocytes 12/14/2021 15* 1 - 7 % Final    Eosinophils % 12/14/2021 2  0 - 4 % Final    Basophils 12/14/2021 1  0 - 2 % Final    Immature Granulocytes 12/14/2021 NOT REPORTED  0 % Final    Segs Absolute 12/14/2021 4.60  1.3 - 9.1 k/uL Final    Absolute Lymph # 12/14/2021 1.80  1.0 - 4.8 k/uL Final    Absolute Mono # 12/14/2021 1.10  0.1 - 1.3 k/uL Final    Absolute Eos # 12/14/2021 0.10  0.0 - 0.4 k/uL Final    Basophils Absolute 12/14/2021 0.00  0.0 - 0.2 k/uL Final    Absolute Immature Granulocyte 12/14/2021 NOT REPORTED  0.00 - 0.30 k/uL Final    WBC Morphology 12/14/2021 NOT REPORTED   Final    RBC Morphology 12/14/2021 NOT REPORTED   Final    Platelet Estimate 17/22/3811 NOT REPORTED   Final    Glucose 12/14/2021 106* 70 - 99 mg/dL Final    BUN 12/14/2021 13  6 - 20 mg/dL Final    CREATININE 12/14/2021 0.76  0.70 - 1.20 mg/dL Final    Bun/Cre Ratio 12/14/2021 NOT REPORTED  9 - 20 Final    Calcium 12/14/2021 9.0  8.6 - 10.4 mg/dL Final    Sodium 12/14/2021 136  135 - 144 mmol/L Final    Potassium 12/14/2021 3.7  3.7 - 5.3 mmol/L Final    Chloride 12/14/2021 100  98 - 107 mmol/L Final    CO2 12/14/2021 24  20 - 31 mmol/L Final    Anion Gap 12/14/2021 12  9 - 17 mmol/L Final    Alkaline Phosphatase 12/14/2021 69  40 - 129 U/L Final    ALT 12/14/2021 76* 5 - 41 U/L Final    AST 12/14/2021 49* <40 U/L Final    Total Bilirubin 12/14/2021 0.76  0.3 - 1.2 mg/dL Final    Total Protein 12/14/2021 7.2  6.4 - 8.3 g/dL Final    Albumin 12/14/2021 4.3  3.5 - 5.2 g/dL Final    Albumin/Globulin Ratio 12/14/2021 NOT REPORTED  1.0 - 2.5 Final    GFR Non- 12/14/2021 >60  >60 mL/min Final    GFR  12/14/2021 >60  >60 mL/min Final    GFR Comment 12/14/2021        Final    Comment: Average GFR for 30-36 years old:   80 mL/min/1.73sq m  Chronic Kidney Disease:   <60 mL/min/1.73sq m  Kidney failure:   <15 mL/min/1.73sq m              eGFR calculated using average adult body mass.  Additional eGFR calculator available at:        [employer]            GFR Staging 12/14/2021 NOT REPORTED   Final    Ethanol 12/14/2021 <10  <10 mg/dL Final    Ethanol percent 12/14/2021 <0.010  % Final    Amphetamine Screen, Ur 12/14/2021 POSITIVE* NEGATIVE Final    Comment:       (Positive cutoff 1000 ng/mL)                  Barbiturate Screen, Ur 12/14/2021 NEGATIVE  NEGATIVE Final    Comment:       (Positive cutoff 200 ng/mL)  Benzodiazepine Screen, Urine 12/14/2021 NEGATIVE  NEGATIVE Final    Comment:       (Positive cutoff 200 ng/mL)                  Cocaine Metabolite, Urine 12/14/2021 NEGATIVE  NEGATIVE Final    Comment:       (Positive cutoff 300 ng/mL)                  Methadone Screen, Urine 12/14/2021 NEGATIVE  NEGATIVE Final    Comment:       (Positive cutoff 300 ng/mL)                  Opiates, Urine 12/14/2021 NEGATIVE  NEGATIVE Final    Comment:       (Positive cutoff 300 ng/mL)                  Phencyclidine, Urine 12/14/2021 NEGATIVE  NEGATIVE Final    Comment:       (Positive cutoff 25 ng/mL)                  Propoxyphene, Urine 12/14/2021 NOT REPORTED  NEGATIVE Final    Cannabinoid Scrn, Ur 12/14/2021 NEGATIVE  NEGATIVE Final    Comment:       (Positive cutoff 50 ng/mL)                  Oxycodone Screen, Ur 12/14/2021 NEGATIVE  NEGATIVE Final    Comment:       (Positive cutoff 100 ng/mL)                  Methamphetamine, Urine 12/14/2021 NOT REPORTED  NEGATIVE Final    Tricyclic Antidepressants, Urine 12/14/2021 NOT REPORTED  NEGATIVE Final    MDMA, Urine 12/14/2021 NOT REPORTED  NEGATIVE Final    Buprenorphine Urine 12/14/2021 NOT REPORTED  NEGATIVE Final    Test Information 12/14/2021 Assay provides medical screening only. The absence of expected drug(s) and/or metabolite(s) may indicate diluted or adulterated urine, limitations of testing or timing of collection. Final    Comment: Testing for legal purposes should be confirmed by another method. To request confirmation   of test result, please call the lab within 7 days of sample submission.  Acetaminophen Level 12/14/2021 <5* 10 - 30 ug/mL Final    Salicylate Lvl 80/54/2030 <1* 3 - 10 mg/dL Final    Specimen Description 12/14/2021 . NASOPHARYNGEAL SWAB   Final    SARS-CoV-2, Rapid 12/14/2021 Not Detected  Not Detected Final    Comment:       Rapid NAAT:  The specimen is NEGATIVE for SARS-CoV-2, the novel coronavirus associated with Daily PRN  traZODone (DESYREL) tablet 50 mg, 50 mg, Oral, Nightly PRN  haloperidol lactate (HALDOL) injection 5 mg, 5 mg, IntraMUSCular, Q4H PRN **AND** LORazepam (ATIVAN) injection 2 mg, 2 mg, IntraMUSCular, Q4H PRN **AND** diphenhydrAMINE (BENADRYL) injection 50 mg, 50 mg, IntraMUSCular, Q4H PRN  haloperidol (HALDOL) tablet 5 mg, 5 mg, Oral, Q4H PRN **AND** LORazepam (ATIVAN) tablet 2 mg, 2 mg, Oral, Q4H PRN  influenza quadrivalent split vaccine (FLUZONE;FLUARIX;FLULAVAL;AFLURIA) injection 0.5 mL, 0.5 mL, IntraMUSCular, Prior to discharge    ASSESSMENT  Bipolar disorder, curr episode depressed, severe, w/psychotic features ([facility] [address]         PLAN  Patient symptoms are: Remains Unstable  Continue current medication regimen  Monitor need and frequency of PRN medications  Encourage participation in groups and milieu  Attempt to develop insight  Psycho-education conducted  Supportive Therapy conducted  Probable discharge: Per attending MD  Follow-up daily while inpatient    Patient continues to be monitored in the inpatient psychiatric facility at [facility] for safety and stabilization. Patient continues to need, on a daily basis, active treatment furnished directly by or requiring the supervision of inpatient psychiatric personnel. Electronically signed by JAZMINE Jamil CNP on 12/16/2021 at 6:35 PM    **This report has been created using voice recognition software. It may contain minor errors which are inherent in voice recognition technology. **  I independently saw and evaluated the patient. I reviewed the midlevel provider's documentation above. Any additional comments or changes to the midlevel provider's documentation are stated below otherwise agree with assessment. The patient reports withdrawal from opioids. He has got some diarrhea. He is got physical discomfort and generalized pain. The patient continues to report anxiety and depressive symptoms.   We will start the patient on Suboxone as noted below.      PLAN  Suboxone 2 bid ordered  Other meds unchanged. Attempt to develop insight  Psycho-education conducted. Supportive Therapy conducted. Probable discharge is 2-9 days.    Follow-up daily while on inpatient unit    Electronically signed by Zeferino Roberts MD on 12/16/21 at 9:52 PM EST

## 2021-12-16 NOTE — PLAN OF CARE
Problem: Suicide risk  Goal: Provide patient with safe environment  Description: Provide patient with safe environment  12/15/2021 2111 by Wolf Ames LPN  Outcome: Ongoing     Problem: Tobacco Use:  Goal: Inpatient tobacco use cessation counseling participation  Description: Inpatient tobacco use cessation counseling participation  12/15/2021 2111 by Wolf Ames LPN  Outcome: Ongoing     Problem: Altered Mood, Depressive Behavior:  Goal: Ability to disclose and discuss suicidal ideas will improve  Description: Ability to disclose and discuss suicidal ideas will improve  12/15/2021 2111 by Wolf Ames LPN  Outcome: Ongoing  Note: Patient is going through withdrawal and is lethargic and complaining of stomach issues as well as just wanting to sleep at this time.      Problem: Pain:  Goal: Pain level will decrease  Description: Pain level will decrease  Outcome: Ongoing

## 2021-12-16 NOTE — BH NOTE
PRN NOTE:  Patient pacing in the dayroom reporting he is dope sick. Patient refused one to one talk time, taking a shower and alone time in the quiet room. Patient given PO PRN clonidine 0.1mg, bentyl 10mg, atarax 50mg, zofran 4mg, and zanaflex 4mg.

## 2021-12-16 NOTE — BH NOTE
PRN follow up note. Patient sitting in dayroom, relaxed, watching tv with peers. No signs of distress after taking PO PRN clonidine 0.1mg, bentylo 10mg, atarax 50 mg zofran 4mg and zanaflex 4mg.

## 2021-12-16 NOTE — GROUP NOTE
Group Therapy Note    Date: 12/16/2021    Group Start Time: 1100  Group End Time: 7370  Group Topic: Cognitive Skills    MANAN Alonso, CTRS    Pt did not attend 1100 cognitive skills group d/t resting in room despite staff invitation to attend. 1:1 talk time offered as alternative to group session, pt declined.               Signature:  Colby Bentley

## 2021-12-16 NOTE — PLAN OF CARE
Problem: Suicide risk  Goal: Provide patient with safe environment  Description: Provide patient with safe environment  12/16/2021 1004 by Isabel Mendiola RN  Outcome: Ongoing  Note: Q15 minute rounding for patient safety. Denies thoughts of self harm. Out for meals only.   Behavior controlled

## 2021-12-17 PROCEDURE — 6360000002 HC RX W HCPCS: Performed by: PSYCHIATRY & NEUROLOGY

## 2021-12-17 PROCEDURE — 6370000000 HC RX 637 (ALT 250 FOR IP): Performed by: NURSE PRACTITIONER

## 2021-12-17 PROCEDURE — 1240000000 HC EMOTIONAL WELLNESS R&B

## 2021-12-17 PROCEDURE — 6370000000 HC RX 637 (ALT 250 FOR IP): Performed by: PSYCHIATRY & NEUROLOGY

## 2021-12-17 PROCEDURE — 99232 SBSQ HOSP IP/OBS MODERATE 35: CPT | Performed by: PSYCHIATRY & NEUROLOGY

## 2021-12-17 RX ADMIN — BUPRENORPHINE AND NALOXONE 1 FILM: 2; .5 FILM BUCCAL; SUBLINGUAL at 08:31

## 2021-12-17 RX ADMIN — NICOTINE POLACRILEX 2 MG: 2 GUM, CHEWING BUCCAL at 21:07

## 2021-12-17 RX ADMIN — OLANZAPINE 10 MG: 10 TABLET, FILM COATED ORAL at 20:51

## 2021-12-17 RX ADMIN — BUPRENORPHINE AND NALOXONE 1 FILM: 2; .5 FILM BUCCAL; SUBLINGUAL at 20:51

## 2021-12-17 RX ADMIN — NICOTINE POLACRILEX 2 MG: 2 GUM, CHEWING BUCCAL at 08:32

## 2021-12-17 RX ADMIN — HYDROXYZINE HYDROCHLORIDE 50 MG: 50 TABLET, FILM COATED ORAL at 20:51

## 2021-12-17 RX ADMIN — NICOTINE POLACRILEX 2 MG: 2 GUM, CHEWING BUCCAL at 15:56

## 2021-12-17 RX ADMIN — TIZANIDINE 4 MG: 4 TABLET ORAL at 20:51

## 2021-12-17 ASSESSMENT — PAIN SCALES - GENERAL: PAINLEVEL_OUTOF10: 0

## 2021-12-17 NOTE — GROUP NOTE
Group Therapy Note    Date: 12/17/2021    Group Start Time: 5604  Group End Time: 1500  Group Topic: Relaxation    [facility] D    Kitty Dk, CTRS    Pt did not attend 1400 relaxation group d/t resting in room despite staff invitation to attend. 1:1 talk time offered as alternative to group session, pt declined.           Signature:  Татьяна Lawson

## 2021-12-17 NOTE — GROUP NOTE
Group Therapy Note    Date: 12/17/2021    Group Start Time: 1100  Group End Time: 1130  Group Topic: Cognitive Skills    MANAN Curiel, JEREMIAHS    Pt did not attend 1100 cognitive skills group d/t resting in room despite staff invitation to attend. 1:1 talk time offered as alternative to group session, pt declined.               Signature:  Cornelius Dixon

## 2021-12-17 NOTE — PLAN OF CARE
Problem: Suicide risk  Goal: Provide patient with safe environment  Description: Provide patient with safe environment  Outcome: Ongoing     Problem: Tobacco Use:  Goal: Inpatient tobacco use cessation counseling participation  Description: Inpatient tobacco use cessation counseling participation  Outcome: Ongoing     Problem: Altered Mood, Depressive Behavior:  Goal: Ability to disclose and discuss suicidal ideas will improve  Description: Ability to disclose and discuss suicidal ideas will improve  Outcome: Ongoing  Note: Patient denies any suicidal/homicidal ideations. Patient isolates, openly communicates and is pleasant/respectful. Patient provided 1:1 talk time and coping skills reviewed. Patient states that his medications are improving his mood and calming the overwhelming thoughts. Problem: Pain:  Goal: Pain level will decrease  Description: Pain level will decrease  Outcome: Ongoing  Note: Patient denies any discomfort.   Goal: Control of acute pain  Description: Control of acute pain  Outcome: Ongoing  Goal: Control of chronic pain  Description: Control of chronic pain  Outcome: Ongoing

## 2021-12-18 PROCEDURE — 6370000000 HC RX 637 (ALT 250 FOR IP): Performed by: PSYCHIATRY & NEUROLOGY

## 2021-12-18 PROCEDURE — 6370000000 HC RX 637 (ALT 250 FOR IP): Performed by: NURSE PRACTITIONER

## 2021-12-18 PROCEDURE — 99232 SBSQ HOSP IP/OBS MODERATE 35: CPT | Performed by: NURSE PRACTITIONER

## 2021-12-18 PROCEDURE — 6360000002 HC RX W HCPCS: Performed by: PSYCHIATRY & NEUROLOGY

## 2021-12-18 PROCEDURE — 1240000000 HC EMOTIONAL WELLNESS R&B

## 2021-12-18 RX ORDER — OLANZAPINE 15 MG/1
15 TABLET ORAL NIGHTLY
Status: DISCONTINUED | OUTPATIENT
Start: 2021-12-18 | End: 2021-12-20 | Stop reason: HOSPADM

## 2021-12-18 RX ADMIN — HYDROXYZINE HYDROCHLORIDE 50 MG: 50 TABLET, FILM COATED ORAL at 20:38

## 2021-12-18 RX ADMIN — NICOTINE POLACRILEX 2 MG: 2 GUM, CHEWING BUCCAL at 16:00

## 2021-12-18 RX ADMIN — NICOTINE POLACRILEX 2 MG: 2 GUM, CHEWING BUCCAL at 19:49

## 2021-12-18 RX ADMIN — NICOTINE POLACRILEX 2 MG: 2 GUM, CHEWING BUCCAL at 16:30

## 2021-12-18 RX ADMIN — NICOTINE POLACRILEX 2 MG: 2 GUM, CHEWING BUCCAL at 08:26

## 2021-12-18 RX ADMIN — ACETAMINOPHEN 650 MG: 325 TABLET, FILM COATED ORAL at 20:38

## 2021-12-18 RX ADMIN — NICOTINE POLACRILEX 2 MG: 2 GUM, CHEWING BUCCAL at 22:12

## 2021-12-18 RX ADMIN — OLANZAPINE 15 MG: 15 TABLET, FILM COATED ORAL at 20:38

## 2021-12-18 RX ADMIN — NICOTINE POLACRILEX 2 MG: 2 GUM, CHEWING BUCCAL at 14:00

## 2021-12-18 RX ADMIN — BUPRENORPHINE AND NALOXONE 1 FILM: 2; .5 FILM BUCCAL; SUBLINGUAL at 20:38

## 2021-12-18 RX ADMIN — BUPRENORPHINE AND NALOXONE 1 FILM: 2; .5 FILM BUCCAL; SUBLINGUAL at 08:26

## 2021-12-18 RX ADMIN — NICOTINE POLACRILEX 2 MG: 2 GUM, CHEWING BUCCAL at 17:58

## 2021-12-18 ASSESSMENT — PAIN DESCRIPTION - LOCATION
LOCATION: GENERALIZED
LOCATION: GENERALIZED

## 2021-12-18 ASSESSMENT — PAIN SCALES - GENERAL
PAINLEVEL_OUTOF10: 2
PAINLEVEL_OUTOF10: 3

## 2021-12-18 NOTE — PLAN OF CARE
Problem: Altered Mood, Depressive Behavior:  Goal: Ability to disclose and discuss suicidal ideas will improve  Description: Ability to disclose and discuss suicidal ideas will improve  12/18/2021 0015 by Roman [facility]  Outcome: Ongoing  Note: Pt denies thoughts of self harm and is agreeable to seeking out should thoughts of self harm arise. Safe environment maintained. Q15 minute checks for safety cont per unit policy. Will cont to monitor for safety and provides support and reassurance as needed. Problem: Pain:  Goal: Control of acute pain  Description: Control of acute pain  12/18/2021 0015 by Roman Brock  Outcome: Ongoing  Note: Patient denies pain at this time. Resting quietly in room. Safety checks maintained q15min and irregular rounding.

## 2021-12-18 NOTE — GROUP NOTE
Group Therapy Note    Date: 12/18/2021    Group Start Time: 1400  Group End Time: 1430  Group Topic: Group Documentation    [facility]  [address], RN        Group Therapy Note    Attendees:          Patient's Goal:  Making medications a priority    Notes:  Discharge planning    Status After Intervention:  Improved    Participation Level: Interactive    Participation Quality: Appropriate      Speech:  normal      Thought Process/Content: Logical      Affective Functioning: Congruent      Mood: anxious      Level of consciousness:  Alert      Response to Learning: Able to retain information      Endings: None Reported    Modes of Intervention: Education      Discipline Responsible: Registered Nurse      Signature:  Edmond Cheng RN

## 2021-12-18 NOTE — PROGRESS NOTES
Daily Progress Note  12/17/2021    Patient Name: Juan F Distance: Depression with suicidal ideation         Emergency Medications: As required opiate withdrawal as needed clonidine 0.1 mg once today. Scheduled medications: Adherent    SUBJECTIVE:     The patient states that his opioid withdrawal symptoms are well controlled. We discussed that I reviewed [place] PDMP and there is no evidence of Suboxone prescriptions in the last year. He said he has received Suboxone in the remote past.  I discussed with the patient that he would need to be linked in with an outpatient provider for us to give him a bridge prescription of Suboxone. The patient continues to report depressive symptoms and suicidal ideation. He is denying any auditory visual hallucinations or psychotic phenomena. Appetite:  [] Normal/Adequate/Unchanged  [x] Increased  [] Decreased      Sleep:       [] Normal/Adequate/Unchanged  [x] Fair  [] Poor      Group Attendance on Unit:   [] Yes  [] Selectively    [x] No    Medication Side Effects: Denies         Mental Status Exam  Level of consciousness: Alert and awake   Appearance: Appropriate attire for setting, seated in chair, with poor grooming and fair hygiene   Behavior/Motor: Approachable, no psychomotor abnormalities   Attitude toward examiner: Cooperative, attentive, good eye contact   Speech: spontaneous, normal rate, normal volume and well articulated   Mood:  Constricted  Affect: Congruent  Thought processes: linear   Thought content: Denies homicidal ideation  Suicidal Ideation: Endorses suicidal ideation without a plan, able to contract for safety on unit  Delusions: Denies  Perceptual Disturbance: Denies. Does not appear to be responding to internal stimuli  Cognition: Oriented to self, location, time, and situation  Memory: intact  Insight & Judgement: fair     Data   height is 6' (1.829 m) and weight is 200 lb (90.7 kg).  His temporal temperature is 98.2 °F (36.8 °C). His blood pressure is 123/73 and his pulse is 72. His respiration is 14 and oxygen saturation is 100%.    Labs:   Admission on 12/14/2021   Component Date Value Ref Range Status    WBC 12/14/2021 7.6  3.5 - 11.0 k/uL Final    RBC 12/14/2021 5.01  4.5 - 5.9 m/uL Final    Hemoglobin 12/14/2021 14.8  13.5 - 17.5 g/dL Final    Hematocrit 12/14/2021 41.8  41 - 53 % Final    MCV 12/14/2021 83.5  80 - 100 fL Final    MCH 12/14/2021 29.6  26 - 34 pg Final    MCHC 12/14/2021 35.4  31 - 37 g/dL Final    RDW 12/14/2021 13.1  11.5 - 14.9 % Final    Platelets 60/11/5154 188  150 - 450 k/uL Final    MPV 12/14/2021 8.1  6.0 - 12.0 fL Final    NRBC Automated 12/14/2021 NOT REPORTED  per 100 WBC Final    Differential Type 12/14/2021 NOT REPORTED   Final    Seg Neutrophils 12/14/2021 59  36 - 66 % Final    Lymphocytes 12/14/2021 23* 24 - 44 % Final    Monocytes 12/14/2021 15* 1 - 7 % Final    Eosinophils % 12/14/2021 2  0 - 4 % Final    Basophils 12/14/2021 1  0 - 2 % Final    Immature Granulocytes 12/14/2021 NOT REPORTED  0 % Final    Segs Absolute 12/14/2021 4.60  1.3 - 9.1 k/uL Final    Absolute Lymph # 12/14/2021 1.80  1.0 - 4.8 k/uL Final    Absolute Mono # 12/14/2021 1.10  0.1 - 1.3 k/uL Final    Absolute Eos # 12/14/2021 0.10  0.0 - 0.4 k/uL Final    Basophils Absolute 12/14/2021 0.00  0.0 - 0.2 k/uL Final    Absolute Immature Granulocyte 12/14/2021 NOT REPORTED  0.00 - 0.30 k/uL Final    WBC Morphology 12/14/2021 NOT REPORTED   Final    RBC Morphology 12/14/2021 NOT REPORTED   Final    Platelet Estimate 82/78/5612 NOT REPORTED   Final    Glucose 12/14/2021 106* 70 - 99 mg/dL Final    BUN 12/14/2021 13  6 - 20 mg/dL Final    CREATININE 12/14/2021 0.76  0.70 - 1.20 mg/dL Final    Bun/Cre Ratio 12/14/2021 NOT REPORTED  9 - 20 Final    Calcium 12/14/2021 9.0  8.6 - 10.4 mg/dL Final    Sodium 12/14/2021 136  135 - 144 mmol/L Final    Potassium 12/14/2021 3.7  3.7 - 5.3 mmol/L Final    Chloride 12/14/2021 100  98 - 107 mmol/L Final    CO2 12/14/2021 24  20 - 31 mmol/L Final    Anion Gap 12/14/2021 12  9 - 17 mmol/L Final    Alkaline Phosphatase 12/14/2021 69  40 - 129 U/L Final    ALT 12/14/2021 76* 5 - 41 U/L Final    AST 12/14/2021 49* <40 U/L Final    Total Bilirubin 12/14/2021 0.76  0.3 - 1.2 mg/dL Final    Total Protein 12/14/2021 7.2  6.4 - 8.3 g/dL Final    Albumin 12/14/2021 4.3  3.5 - 5.2 g/dL Final    Albumin/Globulin Ratio 12/14/2021 NOT REPORTED  1.0 - 2.5 Final    GFR Non- 12/14/2021 >60  >60 mL/min Final    GFR  12/14/2021 >60  >60 mL/min Final    GFR Comment 12/14/2021        Final    Comment: Average GFR for 30-36 years old:   80 mL/min/1.73sq m  Chronic Kidney Disease:   <60 mL/min/1.73sq m  Kidney failure:   <15 mL/min/1.73sq m              eGFR calculated using average adult body mass.  Additional eGFR calculator available at:        [employer]            GFR Staging 12/14/2021 NOT REPORTED   Final    Ethanol 12/14/2021 <10  <10 mg/dL Final    Ethanol percent 12/14/2021 <0.010  % Final    Amphetamine Screen, Ur 12/14/2021 POSITIVE* NEGATIVE Final    Comment:       (Positive cutoff 1000 ng/mL)                  Barbiturate Screen, Ur 12/14/2021 NEGATIVE  NEGATIVE Final    Comment:       (Positive cutoff 200 ng/mL)                  Benzodiazepine Screen, Urine 12/14/2021 NEGATIVE  NEGATIVE Final    Comment:       (Positive cutoff 200 ng/mL)                  Cocaine Metabolite, Urine 12/14/2021 NEGATIVE  NEGATIVE Final    Comment:       (Positive cutoff 300 ng/mL)                  Methadone Screen, Urine 12/14/2021 NEGATIVE  NEGATIVE Final    Comment:       (Positive cutoff 300 ng/mL)                  Opiates, Urine 12/14/2021 NEGATIVE  NEGATIVE Final    Comment:       (Positive cutoff 300 ng/mL)                  Phencyclidine, Urine 12/14/2021 NEGATIVE  NEGATIVE Final    Comment: (Positive cutoff 25 ng/mL)                  Propoxyphene, Urine 12/14/2021 NOT REPORTED  NEGATIVE Final    Cannabinoid Scrn, Ur 12/14/2021 NEGATIVE  NEGATIVE Final    Comment:       (Positive cutoff 50 ng/mL)                  Oxycodone Screen, Ur 12/14/2021 NEGATIVE  NEGATIVE Final    Comment:       (Positive cutoff 100 ng/mL)                  Methamphetamine, Urine 12/14/2021 NOT REPORTED  NEGATIVE Final    Tricyclic Antidepressants, Urine 12/14/2021 NOT REPORTED  NEGATIVE Final    MDMA, Urine 12/14/2021 NOT REPORTED  NEGATIVE Final    Buprenorphine Urine 12/14/2021 NOT REPORTED  NEGATIVE Final    Test Information 12/14/2021 Assay provides medical screening only. The absence of expected drug(s) and/or metabolite(s) may indicate diluted or adulterated urine, limitations of testing or timing of collection. Final    Comment: Testing for legal purposes should be confirmed by another method. To request confirmation   of test result, please call the lab within 7 days of sample submission.  Acetaminophen Level 12/14/2021 <5* 10 - 30 ug/mL Final    Salicylate Lvl 13/20/2840 <1* 3 - 10 mg/dL Final    Specimen Description 12/14/2021 . NASOPHARYNGEAL SWAB   Final    SARS-CoV-2, Rapid 12/14/2021 Not Detected  Not Detected Final    Comment:       Rapid NAAT:  The specimen is NEGATIVE for SARS-CoV-2, the novel coronavirus associated with   COVID-19. The ID NOW COVID-19 assay is designed to detect the virus that causes COVID-19 in patients   with signs and symptoms of infection who are suspected of COVID-19. An individual without symptoms of COVID-19 and who is not shedding SARS-CoV-2 virus would   expect to have a negative (not detected) result in this assay. Negative results should be treated as presumptive and, if inconsistent with clinical signs   and symptoms or necessary for patient management,  should be tested with an alternative molecular assay.  Negative results do not preclude   SARS-CoV-2 infection and   should not be used as the sole basis for patient management decisions. Fact sheet for Healthcare Providers: [employer]mars  Fact sheet for Patients: [employer]mars          Methodology: Isothermal Nucleic Acid Amplification           Reviewed patient's current plan of care and vital signs with nursing staff.     Labs reviewed: [x] Yes  Last EKG in EMR reviewed: [x] Yes    Medications  Current Facility-Administered Medications: buprenorphine-naloxone (SUBOXONE) 2-0.5 MG SL film 1 Film, 1 Film, SubLINGual, BID  OLANZapine (ZYPREXA) tablet 10 mg, 10 mg, Oral, Nightly  ondansetron (ZOFRAN-ODT) disintegrating tablet 4 mg, 4 mg, Oral, Q8H PRN  cloNIDine (CATAPRES) tablet 0.1 mg, 0.1 mg, Oral, TID PRN  loperamide (IMODIUM) capsule 2 mg, 2 mg, Oral, 4x Daily PRN  dicyclomine (BENTYL) capsule 10 mg, 10 mg, Oral, TID PRN  tiZANidine (ZANAFLEX) tablet 4 mg, 4 mg, Oral, Q8H PRN  acetaminophen (TYLENOL) tablet 650 mg, 650 mg, Oral, Q4H PRN  aluminum & magnesium hydroxide-simethicone (MAALOX) 200-200-20 MG/5ML suspension 30 mL, 30 mL, Oral, Q6H PRN  hydrOXYzine (ATARAX) tablet 50 mg, 50 mg, Oral, TID PRN  ibuprofen (ADVIL;MOTRIN) tablet 400 mg, 400 mg, Oral, Q6H PRN  nicotine polacrilex (NICORETTE) gum 2 mg, 2 mg, Oral, PRN  polyethylene glycol (GLYCOLAX) packet 17 g, 17 g, Oral, Daily PRN  traZODone (DESYREL) tablet 50 mg, 50 mg, Oral, Nightly PRN  haloperidol lactate (HALDOL) injection 5 mg, 5 mg, IntraMUSCular, Q4H PRN **AND** LORazepam (ATIVAN) injection 2 mg, 2 mg, IntraMUSCular, Q4H PRN **AND** diphenhydrAMINE (BENADRYL) injection 50 mg, 50 mg, IntraMUSCular, Q4H PRN  haloperidol (HALDOL) tablet 5 mg, 5 mg, Oral, Q4H PRN **AND** LORazepam (ATIVAN) tablet 2 mg, 2 mg, Oral, Q4H PRN  influenza quadrivalent split vaccine (FLUZONE;FLUARIX;FLULAVAL;AFLURIA) injection 0.5 mL, 0.5 mL, IntraMUSCular, Prior to discharge    ASSESSMENT  Bipolar disorder, curr

## 2021-12-18 NOTE — PROGRESS NOTES
Mental Status Exam  Level of consciousness: Alert and awake    Appearance: Appropriate attire for setting, seated in chair, with poor grooming and fair hygiene   Behavior/Motor: Approachable, no psychomotor abnormalities   Attitude toward examiner: Cooperative, attentive, good eye contact   Speech: spontaneous, normal rate, normal volume and well articulated   Mood: Depressed, anxious  Affect: Congruent  Thought processes: linear and coherent  Thought content: Denies homicidal ideation  Suicidal Ideation: Endorses suicidal ideation without a plan, able to contract for safety on unit  Delusions: Denies  Perceptual Disturbance: Denies. Does not appear to be responding to internal stimuli; endorses improving auditory hallucinations  Cognition: Oriented to self, location, time, and situation  Memory: intact  Insight & Judgement: Poor    Data   height is 6' (1.829 m) and weight is 200 lb (90.7 kg). His temporal temperature is 98 °F (36.7 °C). His blood pressure is 125/62 and his pulse is 60. His respiration is 14 and oxygen saturation is 100%.    Labs:   Admission on 12/14/2021   Component Date Value Ref Range Status    WBC 12/14/2021 7.6  3.5 - 11.0 k/uL Final    RBC 12/14/2021 5.01  4.5 - 5.9 m/uL Final    Hemoglobin 12/14/2021 14.8  13.5 - 17.5 g/dL Final    Hematocrit 12/14/2021 41.8  41 - 53 % Final    MCV 12/14/2021 83.5  80 - 100 fL Final    MCH 12/14/2021 29.6  26 - 34 pg Final    MCHC 12/14/2021 35.4  31 - 37 g/dL Final    RDW 12/14/2021 13.1  11.5 - 14.9 % Final    Platelets 06/20/7350 188  150 - 450 k/uL Final    MPV 12/14/2021 8.1  6.0 - 12.0 fL Final    NRBC Automated 12/14/2021 NOT REPORTED  per 100 WBC Final    Differential Type 12/14/2021 NOT REPORTED   Final    Seg Neutrophils 12/14/2021 59  36 - 66 % Final    Lymphocytes 12/14/2021 23* 24 - 44 % Final    Monocytes 12/14/2021 15* 1 - 7 % Final    Eosinophils % 12/14/2021 2  0 - 4 % Final    Basophils 12/14/2021 1  0 - 2 % Final    Immature Granulocytes 12/14/2021 NOT REPORTED  0 % Final    Segs Absolute 12/14/2021 4.60  1.3 - 9.1 k/uL Final    Absolute Lymph # 12/14/2021 1.80  1.0 - 4.8 k/uL Final    Absolute Mono # 12/14/2021 1.10  0.1 - 1.3 k/uL Final    Absolute Eos # 12/14/2021 0.10  0.0 - 0.4 k/uL Final    Basophils Absolute 12/14/2021 0.00  0.0 - 0.2 k/uL Final    Absolute Immature Granulocyte 12/14/2021 NOT REPORTED  0.00 - 0.30 k/uL Final    WBC Morphology 12/14/2021 NOT REPORTED   Final    RBC Morphology 12/14/2021 NOT REPORTED   Final    Platelet Estimate 13/49/3517 NOT REPORTED   Final    Glucose 12/14/2021 106* 70 - 99 mg/dL Final    BUN 12/14/2021 13  6 - 20 mg/dL Final    CREATININE 12/14/2021 0.76  0.70 - 1.20 mg/dL Final    Bun/Cre Ratio 12/14/2021 NOT REPORTED  9 - 20 Final    Calcium 12/14/2021 9.0  8.6 - 10.4 mg/dL Final    Sodium 12/14/2021 136  135 - 144 mmol/L Final    Potassium 12/14/2021 3.7  3.7 - 5.3 mmol/L Final    Chloride 12/14/2021 100  98 - 107 mmol/L Final    CO2 12/14/2021 24  20 - 31 mmol/L Final    Anion Gap 12/14/2021 12  9 - 17 mmol/L Final    Alkaline Phosphatase 12/14/2021 69  40 - 129 U/L Final    ALT 12/14/2021 76* 5 - 41 U/L Final    AST 12/14/2021 49* <40 U/L Final    Total Bilirubin 12/14/2021 0.76  0.3 - 1.2 mg/dL Final    Total Protein 12/14/2021 7.2  6.4 - 8.3 g/dL Final    Albumin 12/14/2021 4.3  3.5 - 5.2 g/dL Final    Albumin/Globulin Ratio 12/14/2021 NOT REPORTED  1.0 - 2.5 Final    GFR Non- 12/14/2021 >60  >60 mL/min Final    GFR  12/14/2021 >60  >60 mL/min Final    GFR Comment 12/14/2021        Final    Comment: Average GFR for 30-36 years old:   80 mL/min/1.73sq m  Chronic Kidney Disease:   <60 mL/min/1.73sq m  Kidney failure:   <15 mL/min/1.73sq m              eGFR calculated using average adult body mass.  Additional eGFR calculator available at:        [email or web address]            GFR Staging 12/14/2021 NOT REPORTED   Final    Ethanol 12/14/2021 <10  <10 mg/dL Final    Ethanol percent 12/14/2021 <0.010  % Final    Amphetamine Screen, Ur 12/14/2021 POSITIVE* NEGATIVE Final    Comment:       (Positive cutoff 1000 ng/mL)                  Barbiturate Screen, Ur 12/14/2021 NEGATIVE  NEGATIVE Final    Comment:       (Positive cutoff 200 ng/mL)                  Benzodiazepine Screen, Urine 12/14/2021 NEGATIVE  NEGATIVE Final    Comment:       (Positive cutoff 200 ng/mL)                  Cocaine Metabolite, Urine 12/14/2021 NEGATIVE  NEGATIVE Final    Comment:       (Positive cutoff 300 ng/mL)                  Methadone Screen, Urine 12/14/2021 NEGATIVE  NEGATIVE Final    Comment:       (Positive cutoff 300 ng/mL)                  Opiates, Urine 12/14/2021 NEGATIVE  NEGATIVE Final    Comment:       (Positive cutoff 300 ng/mL)                  Phencyclidine, Urine 12/14/2021 NEGATIVE  NEGATIVE Final    Comment:       (Positive cutoff 25 ng/mL)                  Propoxyphene, Urine 12/14/2021 NOT REPORTED  NEGATIVE Final    Cannabinoid Scrn, Ur 12/14/2021 NEGATIVE  NEGATIVE Final    Comment:       (Positive cutoff 50 ng/mL)                  Oxycodone Screen, Ur 12/14/2021 NEGATIVE  NEGATIVE Final    Comment:       (Positive cutoff 100 ng/mL)                  Methamphetamine, Urine 12/14/2021 NOT REPORTED  NEGATIVE Final    Tricyclic Antidepressants, Urine 12/14/2021 NOT REPORTED  NEGATIVE Final    MDMA, Urine 12/14/2021 NOT REPORTED  NEGATIVE Final    Buprenorphine Urine 12/14/2021 NOT REPORTED  NEGATIVE Final    Test Information 12/14/2021 Assay provides medical screening only. The absence of expected drug(s) and/or metabolite(s) may indicate diluted or adulterated urine, limitations of testing or timing of collection. Final    Comment: Testing for legal purposes should be confirmed by another method.   To request confirmation   of test result, please call the lab within 7 days of sample submission.  Acetaminophen Level 12/14/2021 <5* 10 - 30 ug/mL Final    Salicylate Lvl 47/60/7718 <1* 3 - 10 mg/dL Final    Specimen Description 12/14/2021 . NASOPHARYNGEAL SWAB   Final    SARS-CoV-2, Rapid 12/14/2021 Not Detected  Not Detected Final    Comment:       Rapid NAAT:  The specimen is NEGATIVE for SARS-CoV-2, the novel coronavirus associated with   COVID-19. The ID NOW COVID-19 assay is designed to detect the virus that causes COVID-19 in patients   with signs and symptoms of infection who are suspected of COVID-19. An individual without symptoms of COVID-19 and who is not shedding SARS-CoV-2 virus would   expect to have a negative (not detected) result in this assay. Negative results should be treated as presumptive and, if inconsistent with clinical signs   and symptoms or necessary for patient management,  should be tested with an alternative molecular assay. Negative results do not preclude   SARS-CoV-2 infection and   should not be used as the sole basis for patient management decisions. Fact sheet for Healthcare Providers: Jamie  Fact sheet for Patients: Jamie          Methodology: Isothermal Nucleic Acid Amplification           Reviewed patient's current plan of care and vital signs with nursing staff.     Labs reviewed: [x] Yes  Last EKG in EMR reviewed: [x] Yes    Medications  Current Facility-Administered Medications: buprenorphine-naloxone (SUBOXONE) 2-0.5 MG SL film 1 Film, 1 Film, SubLINGual, BID  OLANZapine (ZYPREXA) tablet 10 mg, 10 mg, Oral, Nightly  ondansetron (ZOFRAN-ODT) disintegrating tablet 4 mg, 4 mg, Oral, Q8H PRN  cloNIDine (CATAPRES) tablet 0.1 mg, 0.1 mg, Oral, TID PRN  loperamide (IMODIUM) capsule 2 mg, 2 mg, Oral, 4x Daily PRN  dicyclomine (BENTYL) capsule 10 mg, 10 mg, Oral, TID PRN  tiZANidine (ZANAFLEX) tablet 4 mg, 4 mg, Oral, Q8H PRN  acetaminophen (TYLENOL) tablet 650 mg, 650 mg, Oral, Q4H PRN  aluminum & magnesium hydroxide-simethicone (MAALOX) 200-200-20 MG/5ML suspension 30 mL, 30 mL, Oral, Q6H PRN  hydrOXYzine (ATARAX) tablet 50 mg, 50 mg, Oral, TID PRN  ibuprofen (ADVIL;MOTRIN) tablet 400 mg, 400 mg, Oral, Q6H PRN  nicotine polacrilex (NICORETTE) gum 2 mg, 2 mg, Oral, PRN  polyethylene glycol (GLYCOLAX) packet 17 g, 17 g, Oral, Daily PRN  traZODone (DESYREL) tablet 50 mg, 50 mg, Oral, Nightly PRN  haloperidol lactate (HALDOL) injection 5 mg, 5 mg, IntraMUSCular, Q4H PRN **AND** LORazepam (ATIVAN) injection 2 mg, 2 mg, IntraMUSCular, Q4H PRN **AND** diphenhydrAMINE (BENADRYL) injection 50 mg, 50 mg, IntraMUSCular, Q4H PRN  haloperidol (HALDOL) tablet 5 mg, 5 mg, Oral, Q4H PRN **AND** LORazepam (ATIVAN) tablet 2 mg, 2 mg, Oral, Q4H PRN    ASSESSMENT  Bipolar disorder, curr episode depressed, severe, w/psychotic features ([facility] [address]         PLAN  Patient symptoms are: Modestly improving  Titrate Zyprexa to 15 mg by mouth daily  Monitor need and frequency of PRN medications  Encourage participation in groups and milieu  Attempt to develop insight  Psycho-education conducted  Supportive Therapy conducted  Probable discharge: Per attending physician  Follow-up daily while inpatient    Patient continues to be monitored in the inpatient psychiatric facility at [facility] for safety and stabilization. Patient continues to need, on a daily basis, active treatment furnished directly by or requiring the supervision of inpatient psychiatric personnel.     Electronically signed by JAZMINE Simental - MARK

## 2021-12-18 NOTE — GROUP NOTE
Group Therapy Note    Date: 12/18/2021    Group Start Time: 1030  Group End Time: 1100  Group Topic: Psychoeducation    MANAN Alaniz        Group Therapy Note       Patient refused to attend psychotherapy group after encouragement from staff. 1:1 talk time offered but refused. Signature:   Yue Alaniz

## 2021-12-19 PROCEDURE — 6360000002 HC RX W HCPCS: Performed by: PSYCHIATRY & NEUROLOGY

## 2021-12-19 PROCEDURE — 6370000000 HC RX 637 (ALT 250 FOR IP): Performed by: PSYCHIATRY & NEUROLOGY

## 2021-12-19 PROCEDURE — 1240000000 HC EMOTIONAL WELLNESS R&B

## 2021-12-19 PROCEDURE — 6370000000 HC RX 637 (ALT 250 FOR IP): Performed by: NURSE PRACTITIONER

## 2021-12-19 PROCEDURE — 99232 SBSQ HOSP IP/OBS MODERATE 35: CPT | Performed by: NURSE PRACTITIONER

## 2021-12-19 RX ORDER — BUPRENORPHINE AND NALOXONE 4; 1 MG/1; MG/1
1 FILM, SOLUBLE BUCCAL; SUBLINGUAL 2 TIMES DAILY
Status: DISCONTINUED | OUTPATIENT
Start: 2021-12-19 | End: 2021-12-20

## 2021-12-19 RX ADMIN — BUPRENORPHINE AND NALOXONE 1 FILM: 2; .5 FILM BUCCAL; SUBLINGUAL at 08:27

## 2021-12-19 RX ADMIN — NICOTINE POLACRILEX 2 MG: 2 GUM, CHEWING BUCCAL at 21:37

## 2021-12-19 RX ADMIN — NICOTINE POLACRILEX 2 MG: 2 GUM, CHEWING BUCCAL at 16:13

## 2021-12-19 RX ADMIN — NICOTINE POLACRILEX 2 MG: 2 GUM, CHEWING BUCCAL at 12:03

## 2021-12-19 RX ADMIN — BUPRENORPHINE AND NALOXONE 1 FILM: 4; 1 FILM BUCCAL; SUBLINGUAL at 20:47

## 2021-12-19 RX ADMIN — NICOTINE POLACRILEX 2 MG: 2 GUM, CHEWING BUCCAL at 13:52

## 2021-12-19 RX ADMIN — OLANZAPINE 15 MG: 15 TABLET, FILM COATED ORAL at 20:47

## 2021-12-19 RX ADMIN — NICOTINE POLACRILEX 2 MG: 2 GUM, CHEWING BUCCAL at 18:47

## 2021-12-19 NOTE — PROGRESS NOTES
Daily Progress Note  12/19/2021    Patient Name: Andreas Barrios: Depression with suicidal ideation         Emergency Medications: Utilizing as needed clonidine for withdrawal symptom management     Scheduled medications: Adherent    SUBJECTIVE:   Patient was seen for follow-up assessment in the day area today. Nursing staff report he has been medication compliant and behaviorally in control. He had been seated with a peer watching football prior to assessment. He was calm and in no acute distress. He was pleasant on approach and agreeable to conversation. His mood is much brighter today than when speaking with him yesterday. Patient reports that his mood is \"better\" today and he is feeling less helpless and hopeless. He is more optimistic about his future and states that he would like to talk to the doctor about receiving a medical approval to bypass 30 days of inpatient rehab in order to return directly to his home. He reports significant agoraphobia and that he does better when he can be at home. He is encouraged to discuss this with attending physician in the morning. Patient continues to endorse symptoms of withdrawal, which are worse in the evening. He is experiencing restless legs, occasional sweating, stomach pain, diarrhea, and body aches. He believes that the Suboxone is not at a therapeutic level and is requesting an increase. Patient is denying suicidal and homicidal ideation. Auditory hallucinations are well controlled with Zyprexa and he is denying any significant side effects. Patient has been attending groups and his interactions with peers and staff have been appropriate. He agrees to contract for safety on the unit.     Appetite:  [x] Normal/Adequate/Unchanged  [] Increased  [] Decreased      Sleep:       [] Normal/Adequate/Unchanged  [x] Fair  [] Poor      Group Attendance on Unit:   [x] Yes  [] Selectively    [] No    Medication Side Effects: Denies Mental Status Exam  Level of consciousness: Alert and awake    Appearance: Appropriate attire for setting, seated in chair, with poor grooming and fair hygiene   Behavior/Motor: Approachable, no psychomotor abnormalities   Attitude toward examiner: Cooperative, attentive, good eye contact   Speech: spontaneous, normal rate, normal volume and well articulated   Mood: Anxious, but improving  Affect: Appropriate  Thought processes: linear and coherent  Thought content: Denies homicidal ideation  Suicidal Ideation: Denies today, no plan or intent, contracts for safety on unit  Delusions: Denies  Perceptual Disturbance: Denies. Does not appear to be responding to internal stimuli; endorses improved auditory hallucinations  Cognition: Oriented to self, location, time, and situation  Memory: intact  Insight & Judgement: Poor    Data   height is 6' (1.829 m) and weight is 200 lb (90.7 kg). His oral temperature is 98.2 °F (36.8 °C). His blood pressure is 114/55 (abnormal) and his pulse is 68. His respiration is 14 and oxygen saturation is 100%.    Labs:   Admission on 12/14/2021   Component Date Value Ref Range Status    WBC 12/14/2021 7.6  3.5 - 11.0 k/uL Final    RBC 12/14/2021 5.01  4.5 - 5.9 m/uL Final    Hemoglobin 12/14/2021 14.8  13.5 - 17.5 g/dL Final    Hematocrit 12/14/2021 41.8  41 - 53 % Final    MCV 12/14/2021 83.5  80 - 100 fL Final    MCH 12/14/2021 29.6  26 - 34 pg Final    MCHC 12/14/2021 35.4  31 - 37 g/dL Final    RDW 12/14/2021 13.1  11.5 - 14.9 % Final    Platelets 23/83/3300 188  150 - 450 k/uL Final    MPV 12/14/2021 8.1  6.0 - 12.0 fL Final    NRBC Automated 12/14/2021 NOT REPORTED  per 100 WBC Final    Differential Type 12/14/2021 NOT REPORTED   Final    Seg Neutrophils 12/14/2021 59  36 - 66 % Final    Lymphocytes 12/14/2021 23* 24 - 44 % Final    Monocytes 12/14/2021 15* 1 - 7 % Final    Eosinophils % 12/14/2021 2  0 - 4 % Final    Basophils 12/14/2021 1  0 - 2 % Final    Immature Granulocytes 12/14/2021 NOT REPORTED  0 % Final    Segs Absolute 12/14/2021 4.60  1.3 - 9.1 k/uL Final    Absolute Lymph # 12/14/2021 1.80  1.0 - 4.8 k/uL Final    Absolute Mono # 12/14/2021 1.10  0.1 - 1.3 k/uL Final    Absolute Eos # 12/14/2021 0.10  0.0 - 0.4 k/uL Final    Basophils Absolute 12/14/2021 0.00  0.0 - 0.2 k/uL Final    Absolute Immature Granulocyte 12/14/2021 NOT REPORTED  0.00 - 0.30 k/uL Final    WBC Morphology 12/14/2021 NOT REPORTED   Final    RBC Morphology 12/14/2021 NOT REPORTED   Final    Platelet Estimate 17/56/6136 NOT REPORTED   Final    Glucose 12/14/2021 106* 70 - 99 mg/dL Final    BUN 12/14/2021 13  6 - 20 mg/dL Final    CREATININE 12/14/2021 0.76  0.70 - 1.20 mg/dL Final    Bun/Cre Ratio 12/14/2021 NOT REPORTED  9 - 20 Final    Calcium 12/14/2021 9.0  8.6 - 10.4 mg/dL Final    Sodium 12/14/2021 136  135 - 144 mmol/L Final    Potassium 12/14/2021 3.7  3.7 - 5.3 mmol/L Final    Chloride 12/14/2021 100  98 - 107 mmol/L Final    CO2 12/14/2021 24  20 - 31 mmol/L Final    Anion Gap 12/14/2021 12  9 - 17 mmol/L Final    Alkaline Phosphatase 12/14/2021 69  40 - 129 U/L Final    ALT 12/14/2021 76* 5 - 41 U/L Final    AST 12/14/2021 49* <40 U/L Final    Total Bilirubin 12/14/2021 0.76  0.3 - 1.2 mg/dL Final    Total Protein 12/14/2021 7.2  6.4 - 8.3 g/dL Final    Albumin 12/14/2021 4.3  3.5 - 5.2 g/dL Final    Albumin/Globulin Ratio 12/14/2021 NOT REPORTED  1.0 - 2.5 Final    GFR Non- 12/14/2021 >60  >60 mL/min Final    GFR  12/14/2021 >60  >60 mL/min Final    GFR Comment 12/14/2021        Final    Comment: Average GFR for 30-36 years old:   80 mL/min/1.73sq m  Chronic Kidney Disease:   <60 mL/min/1.73sq m  Kidney failure:   <15 mL/min/1.73sq m              eGFR calculated using average adult body mass.  Additional eGFR calculator available at:        [employer]            GFR Staging 12/14/2021 NOT REPORTED   Final    Ethanol 12/14/2021 <10  <10 mg/dL Final    Ethanol percent 12/14/2021 <0.010  % Final    Amphetamine Screen, Ur 12/14/2021 POSITIVE* NEGATIVE Final    Comment:       (Positive cutoff 1000 ng/mL)                  Barbiturate Screen, Ur 12/14/2021 NEGATIVE  NEGATIVE Final    Comment:       (Positive cutoff 200 ng/mL)                  Benzodiazepine Screen, Urine 12/14/2021 NEGATIVE  NEGATIVE Final    Comment:       (Positive cutoff 200 ng/mL)                  Cocaine Metabolite, Urine 12/14/2021 NEGATIVE  NEGATIVE Final    Comment:       (Positive cutoff 300 ng/mL)                  Methadone Screen, Urine 12/14/2021 NEGATIVE  NEGATIVE Final    Comment:       (Positive cutoff 300 ng/mL)                  Opiates, Urine 12/14/2021 NEGATIVE  NEGATIVE Final    Comment:       (Positive cutoff 300 ng/mL)                  Phencyclidine, Urine 12/14/2021 NEGATIVE  NEGATIVE Final    Comment:       (Positive cutoff 25 ng/mL)                  Propoxyphene, Urine 12/14/2021 NOT REPORTED  NEGATIVE Final    Cannabinoid Scrn, Ur 12/14/2021 NEGATIVE  NEGATIVE Final    Comment:       (Positive cutoff 50 ng/mL)                  Oxycodone Screen, Ur 12/14/2021 NEGATIVE  NEGATIVE Final    Comment:       (Positive cutoff 100 ng/mL)                  Methamphetamine, Urine 12/14/2021 NOT REPORTED  NEGATIVE Final    Tricyclic Antidepressants, Urine 12/14/2021 NOT REPORTED  NEGATIVE Final    MDMA, Urine 12/14/2021 NOT REPORTED  NEGATIVE Final    Buprenorphine Urine 12/14/2021 NOT REPORTED  NEGATIVE Final    Test Information 12/14/2021 Assay provides medical screening only. The absence of expected drug(s) and/or metabolite(s) may indicate diluted or adulterated urine, limitations of testing or timing of collection. Final    Comment: Testing for legal purposes should be confirmed by another method.   To request confirmation   of test result, please call the lab within 7 days of sample submission.  Acetaminophen Level 12/14/2021 <5* 10 - 30 ug/mL Final    Salicylate Lvl 08/65/2884 <1* 3 - 10 mg/dL Final    Specimen Description 12/14/2021 . NASOPHARYNGEAL SWAB   Final    SARS-CoV-2, Rapid 12/14/2021 Not Detected  Not Detected Final    Comment:       Rapid NAAT:  The specimen is NEGATIVE for SARS-CoV-2, the novel coronavirus associated with   COVID-19. The ID NOW COVID-19 assay is designed to detect the virus that causes COVID-19 in patients   with signs and symptoms of infection who are suspected of COVID-19. An individual without symptoms of COVID-19 and who is not shedding SARS-CoV-2 virus would   expect to have a negative (not detected) result in this assay. Negative results should be treated as presumptive and, if inconsistent with clinical signs   and symptoms or necessary for patient management,  should be tested with an alternative molecular assay. Negative results do not preclude   SARS-CoV-2 infection and   should not be used as the sole basis for patient management decisions. Fact sheet for Healthcare Providers: Jamie  Fact sheet for Patients: Jamie          Methodology: Isothermal Nucleic Acid Amplification           Reviewed patient's current plan of care and vital signs with nursing staff.     Labs reviewed: [x] Yes  Last EKG in EMR reviewed: [x] Yes    Medications  Current Facility-Administered Medications: buprenorphine-naloxone (SUBOXONE) 4-1 MG SL film 1 Film, 1 Film, SubLINGual, BID  OLANZapine (ZYPREXA) tablet 15 mg, 15 mg, Oral, Nightly  ondansetron (ZOFRAN-ODT) disintegrating tablet 4 mg, 4 mg, Oral, Q8H PRN  cloNIDine (CATAPRES) tablet 0.1 mg, 0.1 mg, Oral, TID PRN  loperamide (IMODIUM) capsule 2 mg, 2 mg, Oral, 4x Daily PRN  dicyclomine (BENTYL) capsule 10 mg, 10 mg, Oral, TID PRN  tiZANidine (ZANAFLEX) tablet 4 mg, 4 mg, Oral, Q8H PRN  acetaminophen (TYLENOL) tablet 650 mg, 650 mg, Oral, Q4H PRN  aluminum & magnesium hydroxide-simethicone (MAALOX) 200-200-20 MG/5ML suspension 30 mL, 30 mL, Oral, Q6H PRN  hydrOXYzine (ATARAX) tablet 50 mg, 50 mg, Oral, TID PRN  ibuprofen (ADVIL;MOTRIN) tablet 400 mg, 400 mg, Oral, Q6H PRN  nicotine polacrilex (NICORETTE) gum 2 mg, 2 mg, Oral, PRN  polyethylene glycol (GLYCOLAX) packet 17 g, 17 g, Oral, Daily PRN  traZODone (DESYREL) tablet 50 mg, 50 mg, Oral, Nightly PRN  haloperidol lactate (HALDOL) injection 5 mg, 5 mg, IntraMUSCular, Q4H PRN **AND** LORazepam (ATIVAN) injection 2 mg, 2 mg, IntraMUSCular, Q4H PRN **AND** diphenhydrAMINE (BENADRYL) injection 50 mg, 50 mg, IntraMUSCular, Q4H PRN  haloperidol (HALDOL) tablet 5 mg, 5 mg, Oral, Q4H PRN **AND** LORazepam (ATIVAN) tablet 2 mg, 2 mg, Oral, Q4H PRN    ASSESSMENT  Bipolar disorder, curr episode depressed, severe, w/psychotic features ([facility] [address]         PLAN  Patient symptoms are: Improving  Titrate Suboxone to 4-1 mg, 1 sublingual film 2 times a day  Monitor need and frequency of PRN medications  Encourage participation in groups and milieu  Attempt to develop insight  Psycho-education conducted  Supportive Therapy conducted  Probable discharge: Per attending physician  Follow-up daily while inpatient    Patient continues to be monitored in the inpatient psychiatric facility at [facility] for safety and stabilization. Patient continues to need, on a daily basis, active treatment furnished directly by or requiring the supervision of inpatient psychiatric personnel.     Electronically signed by JAZMINE Silvestre - MARK

## 2021-12-19 NOTE — PLAN OF CARE
Problem: Suicide risk  Goal: Provide patient with safe environment  Description: Provide patient with safe environment  12/19/2021 1157 by Raegan Gaona RN  Outcome: Ongoing  Note: Q15 minute rounding for patient safety. Patient out for meals only. Denies any thoughts of self harm. Behavior controlled.

## 2021-12-19 NOTE — GROUP NOTE
Group Therapy Note    Date: 12/19/2021    Group Start Time: 1400  Group End Time: 1430  Group Topic: Group Documentation    Everardo Hernandez RN        Group Therapy Note    Attendees:          Patient's Goal:  Learning to face anxiety    Notes:  Wellness group    Status After Intervention:  Unchanged    Participation Level:  Active Listener    Participation Quality: Appropriate      Speech:  normal      Thought Process/Content: Logical      Affective Functioning: Congruent      Mood: anxious      Level of consciousness:  Alert      Response to Learning: Able to retain information      Endings: None Reported    Modes of Intervention: Education      Discipline Responsible: Registered Nurse      Signature:  Afua Moreno RN

## 2021-12-20 VITALS
DIASTOLIC BLOOD PRESSURE: 63 MMHG | OXYGEN SATURATION: 100 % | TEMPERATURE: 98.7 F | BODY MASS INDEX: 27.09 KG/M2 | HEART RATE: 68 BPM | HEIGHT: 72 IN | WEIGHT: 200 LBS | SYSTOLIC BLOOD PRESSURE: 116 MMHG | RESPIRATION RATE: 14 BRPM

## 2021-12-20 PROCEDURE — 6370000000 HC RX 637 (ALT 250 FOR IP): Performed by: PSYCHIATRY & NEUROLOGY

## 2021-12-20 PROCEDURE — 6370000000 HC RX 637 (ALT 250 FOR IP): Performed by: NURSE PRACTITIONER

## 2021-12-20 PROCEDURE — 99239 HOSP IP/OBS DSCHRG MGMT >30: CPT | Performed by: PSYCHIATRY & NEUROLOGY

## 2021-12-20 RX ORDER — BUPRENORPHINE AND NALOXONE 4; 1 MG/1; MG/1
1 FILM, SOLUBLE BUCCAL; SUBLINGUAL ONCE
Status: COMPLETED | OUTPATIENT
Start: 2021-12-20 | End: 2021-12-20

## 2021-12-20 RX ORDER — OLANZAPINE 15 MG/1
15 TABLET ORAL NIGHTLY
Qty: 30 TABLET | Refills: 3 | Status: ON HOLD | OUTPATIENT
Start: 2021-12-20 | End: 2022-04-13 | Stop reason: HOSPADM

## 2021-12-20 RX ORDER — CLONIDINE HYDROCHLORIDE 0.1 MG/1
0.1 TABLET ORAL 3 TIMES DAILY PRN
Qty: 60 TABLET | Refills: 0 | Status: ON HOLD | OUTPATIENT
Start: 2021-12-20 | End: 2022-04-13 | Stop reason: HOSPADM

## 2021-12-20 RX ADMIN — BUPRENORPHINE AND NALOXONE 1 FILM: 4; 1 FILM BUCCAL; SUBLINGUAL at 08:34

## 2021-12-20 RX ADMIN — NICOTINE POLACRILEX 2 MG: 2 GUM, CHEWING BUCCAL at 10:06

## 2021-12-20 RX ADMIN — NICOTINE POLACRILEX 2 MG: 2 GUM, CHEWING BUCCAL at 14:23

## 2021-12-20 RX ADMIN — NICOTINE POLACRILEX 2 MG: 2 GUM, CHEWING BUCCAL at 08:34

## 2021-12-20 RX ADMIN — BUPRENORPHINE AND NALOXONE 1 FILM: 4; 1 FILM BUCCAL; SUBLINGUAL at 14:22

## 2021-12-20 NOTE — BH NOTE
Patient given tobacco quitline number 16480551494 at this time, refusing to call at this time, states \" I just dont want to quit now\"- patient given information as to the dangers of long term tobacco use. Continue to reinforce the importance of tobacco cessation.

## 2021-12-20 NOTE — GROUP NOTE
Group Therapy Note    Date: 12/20/2021    Group Start Time: 1100  Group End Time: 5341  Group Topic: Cognitive Skills    MANAN Landaverde, CTRS        Group Therapy Note    Attendees: 9/16         Patient's Goal:  To increase social interaction and to practice decision making, and communication skills . Notes: Pt participated fully in group task . Pt was able to practice decision making and communication skills independently. Pt was pleasant and supportive of peers. Status After Intervention:  Improved     Participation Level:  Active Listener and Interactive     Participation Quality: Appropriate, Attentive, Sharing and Supportive        Speech:  normal        Thought Process/Content: Logical, Linear        Affective Functioning: Congruent        Mood: euthymic        Level of consciousness:  Alert, Oriented x4 and Attentive        Response to Learning: Able to verbalize current knowledge/experience, Able to verbalize/acknowledge new learning, Able to retain information and Progressing to goal        Endings: None Reported     Modes of Intervention: Education, Support, Socialization, Exploration, Clarifying and Problem-solving        Discipline Responsible: Psychoeducational Specialist        Signature:  Mamadou Trinidad

## 2021-12-20 NOTE — PROGRESS NOTES
CLINICAL PHARMACY NOTE: MEDS TO BEDS    Total # of Prescriptions Filled: 2   The following medications were delivered to the patient:  · Clonidine HCL 0.1mg  · Olanzapine 15mg    Additional Documentation:  Delivered medications to nurses station

## 2021-12-20 NOTE — CARE COORDINATION
Name: Ezekiel Dolan    : 1990    Discharge Date: 2021    Primary Auth/Cert #: [id number]    Destination: home via family    Discharge Medications:      Medication List      START taking these medications    cloNIDine 0.1 MG tablet  Commonly known as: CATAPRES  Take 1 tablet by mouth 3 times daily as needed for Other (Withdrawal)  Notes to patient: Withdrawal        CHANGE how you take these medications    OLANZapine 15 MG tablet  Commonly known as: ZYPREXA  Take 1 tablet by mouth nightly  What changed:   · medication strength  · how much to take  Notes to patient: To help clear thinking           Where to Get Your Medications      These medications were sent to [facility], [address], [address] [address]    Phone: [phone]   · cloNIDine 0.1 MG tablet  · OLANZapine 15 MG tablet         Follow Up Appointment: Discharge to father:  Please contact your Geneclayton Fisher at [phone] immediately at time of discharge so she is aware of your location. Go on 2021  Client's father is picking him up at time of discharge.     [address]  Phone: [phone]  Fax: [phone]    Go on 2021  Please go to [facility] for hospital discharge appointment on Tuesday, Dec. 28 at 10:00am

## 2021-12-20 NOTE — PLAN OF CARE
Problem: Suicide risk  Goal: Provide patient with safe environment  Description: Provide patient with safe environment  12/19/2021 2035 by Estee Carter  Outcome: Ongoing     Problem: Altered Mood, Depressive Behavior:  Goal: Ability to disclose and discuss suicidal ideas will improve  Description: Ability to disclose and discuss suicidal ideas will improve  Outcome: Ongoing   Patient denies suicidal ideations this evening. Patient also denies any depression, anxiety and hallucinations. Patient has been out in dayroom social with peers and been calm, cooperative. Q 15 minutes safety checks maintained for safety.
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
## 2021-12-20 NOTE — BH NOTE
[facility]  Discharge Note     Pt belongings: Retrieved from room/safe, reviewed and packed to take with. Dental Appliances: None  Vision - Corrective Lenses: Glasses  Hearing Aid: None  Jewelry: None  Body Piercings Removed: N/A  Clothing: Footwear,Pants,Shirt,Socks  Were All Patient Medications Collected?: Not Applicable  Other Valuables: Other (Comment)       Patient discharged to family residence via fathers car. Instructed on discharge instructions, pt verbalizes understanding and signs AVS [facility]. Pt in control at time of discharge. Pt ambulates to [facility] with psych staff x2. Rx filled and sent with patient from [place] [address]. No complaints voiced at this time.         Status EXAM upon discharge:  Status and Exam  Normal: No  Facial Expression: Flat  Affect: Appropriate  Level of Consciousness: Alert  Mood:Normal: No  Mood: Anxious,Depressed  Motor Activity:Normal: Yes  Motor Activity: Decreased  Interview Behavior: Cooperative  Preception: [place] to Person,[place] to Time,[place] to Place  Attention:Normal: No  Attention: Distractible  Thought Processes: Circumstantial  Thought Content:Normal: No  Thought Content: Preoccupations  Hallucinations: None  Delusions: No  Memory:Normal: No  Memory: Poor Recent,Poor Remote  Insight and Judgment: No  Insight and Judgment: Poor Judgment,Poor Insight  Present Suicidal Ideation: No  Present Homicidal Ideation: No    Herman Kumar LPN

## 2021-12-20 NOTE — CARE COORDINATION
DISCHARGE UPDATE:  - Client approaches writer in office stating \"I have a letter and I need it to be signed by he doctor. \"  Client states he \"worked with an LPN last night and she told me to writer a letter and have the doctor sign\". - Writer asks what letter was stating and client said \"I can't go to inpatient treatment centers because I have PTSD. \"   He said he refuses to go to a half-way house and everyone \"smokes bug spray\". - Writer speaks with doctor and it is confirmed a letter would not be signed. - Writer advises client, he states \"You going to through me out on the streets or do I need to through a chair? \"  Wylene Apgar states that would not be a good plan and client walks away.   - Due to discharge plans changing from inpatient AOD [place] to home, writer calls and leaves a voicemail with PO

## 2021-12-20 NOTE — CARE COORDINATION
DISCHARGE UPDATE:  - Writer speaks with client's MELISSA Olsen at [phone] on this date/time regarding client discharge.  - Lita Knight states client has been instructed at time of discharge to contact her immediately to let her know where he is going. Lita Knight does not want to order a Police Hold and will wait to see if he contacts her today. - Client reports he does not want to go to an inpatient AOD program, does not want to go back to senior living house, does not want to go to his mother's home and is now being picked up by his father.

## 2022-04-09 ENCOUNTER — HOSPITAL ENCOUNTER (INPATIENT)
Age: 32
LOS: 4 days | Discharge: HOME | DRG: 751 | End: 2022-04-13
Attending: STUDENT IN AN ORGANIZED HEALTH CARE EDUCATION/TRAINING PROGRAM | Admitting: PSYCHIATRY & NEUROLOGY
Payer: COMMERCIAL

## 2022-04-09 DIAGNOSIS — R45.851 SUICIDAL IDEATION: Primary | ICD-10-CM

## 2022-04-09 PROBLEM — F32.A DEPRESSION WITH SUICIDAL IDEATION: Status: ACTIVE | Noted: 2022-04-09

## 2022-04-09 LAB
ABSOLUTE EOS #: 0.2 K/UL (ref 0–0.4)
ABSOLUTE LYMPH #: 2.4 K/UL (ref 1–4.8)
ABSOLUTE MONO #: 0.4 K/UL (ref 0.1–1.3)
ALBUMIN SERPL-MCNC: 4.3 G/DL (ref 3.5–5.2)
ALP BLD-CCNC: 80 U/L (ref 40–129)
ALT SERPL-CCNC: 24 U/L (ref 5–41)
AMPHETAMINE SCREEN URINE: POSITIVE
ANION GAP SERPL CALCULATED.3IONS-SCNC: 13 MMOL/L (ref 9–17)
AST SERPL-CCNC: 19 U/L
BACTERIA: ABNORMAL
BARBITURATE SCREEN URINE: NEGATIVE
BASOPHILS # BLD: 1 % (ref 0–2)
BASOPHILS ABSOLUTE: 0 K/UL (ref 0–0.2)
BENZODIAZEPINE SCREEN, URINE: NEGATIVE
BILIRUB SERPL-MCNC: 0.23 MG/DL (ref 0.3–1.2)
BILIRUBIN URINE: NEGATIVE
BUN BLDV-MCNC: 7 MG/DL (ref 6–20)
CALCIUM SERPL-MCNC: 9.3 MG/DL (ref 8.6–10.4)
CANNABINOID SCREEN URINE: POSITIVE
CASTS UA: ABNORMAL /LPF
CHLORIDE BLD-SCNC: 103 MMOL/L (ref 98–107)
CO2: 23 MMOL/L (ref 20–31)
COCAINE METABOLITE, URINE: NEGATIVE
COLOR: YELLOW
CREAT SERPL-MCNC: 0.81 MG/DL (ref 0.7–1.2)
EOSINOPHILS RELATIVE PERCENT: 3 % (ref 0–4)
EPITHELIAL CELLS UA: ABNORMAL /HPF
ETHANOL PERCENT: <0.01 %
ETHANOL: <10 MG/DL
GFR AFRICAN AMERICAN: >60 ML/MIN
GFR NON-AFRICAN AMERICAN: >60 ML/MIN
GFR SERPL CREATININE-BSD FRML MDRD: ABNORMAL ML/MIN/{1.73_M2}
GLUCOSE BLD-MCNC: 106 MG/DL (ref 70–99)
GLUCOSE URINE: NEGATIVE
HCT VFR BLD CALC: 39.2 % (ref 41–53)
HEMOGLOBIN: 13.8 G/DL (ref 13.5–17.5)
KETONES, URINE: NEGATIVE
LEUKOCYTE ESTERASE, URINE: ABNORMAL
LYMPHOCYTES # BLD: 41 % (ref 24–44)
MCH RBC QN AUTO: 29.3 PG (ref 26–34)
MCHC RBC AUTO-ENTMCNC: 35.2 G/DL (ref 31–37)
MCV RBC AUTO: 83.2 FL (ref 80–100)
METHADONE SCREEN, URINE: POSITIVE
MONOCYTES # BLD: 7 % (ref 1–7)
NITRITE, URINE: NEGATIVE
OPIATES, URINE: NEGATIVE
OXYCODONE SCREEN URINE: NEGATIVE
PDW BLD-RTO: 13.3 % (ref 11.5–14.9)
PH UA: 7 (ref 5–8)
PHENCYCLIDINE, URINE: NEGATIVE
PLATELET # BLD: 278 K/UL (ref 150–450)
PMV BLD AUTO: 7.4 FL (ref 6–12)
POTASSIUM SERPL-SCNC: 4.1 MMOL/L (ref 3.7–5.3)
PROTEIN UA: NEGATIVE
RBC # BLD: 4.72 M/UL (ref 4.5–5.9)
RBC UA: ABNORMAL /HPF
SARS-COV-2, RAPID: NOT DETECTED
SEG NEUTROPHILS: 48 % (ref 36–66)
SEGMENTED NEUTROPHILS ABSOLUTE COUNT: 2.8 K/UL (ref 1.3–9.1)
SODIUM BLD-SCNC: 139 MMOL/L (ref 135–144)
SPECIFIC GRAVITY UA: 1.01 (ref 1–1.03)
SPECIMEN DESCRIPTION: NORMAL
TEST INFORMATION: ABNORMAL
TOTAL PROTEIN: 7.4 G/DL (ref 6.4–8.3)
TURBIDITY: CLEAR
URINE HGB: NEGATIVE
UROBILINOGEN, URINE: NORMAL
WBC # BLD: 5.8 K/UL (ref 3.5–11)
WBC UA: ABNORMAL /HPF

## 2022-04-09 PROCEDURE — 36415 COLL VENOUS BLD VENIPUNCTURE: CPT

## 2022-04-09 PROCEDURE — 87635 SARS-COV-2 COVID-19 AMP PRB: CPT

## 2022-04-09 PROCEDURE — 81001 URINALYSIS AUTO W/SCOPE: CPT

## 2022-04-09 PROCEDURE — 85025 COMPLETE CBC W/AUTO DIFF WBC: CPT

## 2022-04-09 PROCEDURE — 1240000000 HC EMOTIONAL WELLNESS R&B

## 2022-04-09 PROCEDURE — 99285 EMERGENCY DEPT VISIT HI MDM: CPT

## 2022-04-09 PROCEDURE — 80307 DRUG TEST PRSMV CHEM ANLYZR: CPT

## 2022-04-09 PROCEDURE — G0480 DRUG TEST DEF 1-7 CLASSES: HCPCS

## 2022-04-09 PROCEDURE — 80053 COMPREHEN METABOLIC PANEL: CPT

## 2022-04-09 RX ORDER — HYDROXYZINE 50 MG/1
50 TABLET, FILM COATED ORAL 3 TIMES DAILY PRN
Status: DISCONTINUED | OUTPATIENT
Start: 2022-04-09 | End: 2022-04-13 | Stop reason: HOSPADM

## 2022-04-09 RX ORDER — ACETAMINOPHEN 325 MG/1
650 TABLET ORAL EVERY 4 HOURS PRN
Status: DISCONTINUED | OUTPATIENT
Start: 2022-04-09 | End: 2022-04-13 | Stop reason: HOSPADM

## 2022-04-09 RX ORDER — POLYETHYLENE GLYCOL 3350 17 G/17G
17 POWDER, FOR SOLUTION ORAL DAILY PRN
Status: DISCONTINUED | OUTPATIENT
Start: 2022-04-09 | End: 2022-04-13 | Stop reason: HOSPADM

## 2022-04-09 RX ORDER — TRAZODONE HYDROCHLORIDE 50 MG/1
50 TABLET ORAL NIGHTLY PRN
Status: DISCONTINUED | OUTPATIENT
Start: 2022-04-09 | End: 2022-04-13 | Stop reason: HOSPADM

## 2022-04-09 RX ORDER — NICOTINE 21 MG/24HR
1 PATCH, TRANSDERMAL 24 HOURS TRANSDERMAL DAILY
Status: DISCONTINUED | OUTPATIENT
Start: 2022-04-09 | End: 2022-04-09

## 2022-04-09 RX ORDER — IBUPROFEN 400 MG/1
400 TABLET ORAL EVERY 6 HOURS PRN
Status: DISCONTINUED | OUTPATIENT
Start: 2022-04-09 | End: 2022-04-13 | Stop reason: HOSPADM

## 2022-04-09 RX ORDER — MAGNESIUM HYDROXIDE/ALUMINUM HYDROXICE/SIMETHICONE 120; 1200; 1200 MG/30ML; MG/30ML; MG/30ML
30 SUSPENSION ORAL EVERY 6 HOURS PRN
Status: DISCONTINUED | OUTPATIENT
Start: 2022-04-09 | End: 2022-04-13 | Stop reason: HOSPADM

## 2022-04-09 ASSESSMENT — ENCOUNTER SYMPTOMS
BACK PAIN: 0
ABDOMINAL PAIN: 0
SHORTNESS OF BREATH: 0
RHINORRHEA: 0
SORE THROAT: 0
DIARRHEA: 0
COLOR CHANGE: 0
VOMITING: 0
FACIAL SWELLING: 0
EYE PAIN: 0
NAUSEA: 0
EYE REDNESS: 0
COUGH: 0

## 2022-04-09 ASSESSMENT — SLEEP AND FATIGUE QUESTIONNAIRES
DIFFICULTY STAYING ASLEEP: YES
AVERAGE NUMBER OF SLEEP HOURS: 6
DO YOU HAVE DIFFICULTY SLEEPING: YES
SLEEP PATTERN: NIGHTMARES/TERRORS;RESTLESSNESS
DIFFICULTY ARISING: NO
RESTFUL SLEEP: YES
DO YOU USE A SLEEP AID: YES
DIFFICULTY FALLING ASLEEP: YES

## 2022-04-09 ASSESSMENT — LIFESTYLE VARIABLES: HISTORY_ALCOHOL_USE: NO

## 2022-04-09 ASSESSMENT — PAIN SCALES - GENERAL: PAINLEVEL_OUTOF10: 0

## 2022-04-09 ASSESSMENT — PATIENT HEALTH QUESTIONNAIRE - PHQ9: SUM OF ALL RESPONSES TO PHQ QUESTIONS 1-9: 18

## 2022-04-09 NOTE — BH NOTE
[id number] [facility]  Admission Note     Admission Type:   Admission Type: Voluntary    Reason for admission:  Reason for Admission: Suicidal to overdose or harm self    PATIENT STRENGTHS:  Strengths: Communication,Positive Support    Patient Strengths and Limitations:  Limitations: Unrealistic self-view    Addictive Behavior:   Addictive Behavior  In the past 3 months, have you felt or has someone told you that you have a problem with:  : None  Do you have a history of Chemical Use?: Yes  Do you have a history of Alcohol Use?: No  Do you have a history of Street Drug Abuse?: Yes  Histroy of Prescripton Drug Abuse?: No    Medical Problems:   Past Medical History:   Diagnosis Date    B[facility] disorder ([facility] [address]     Depression     Substance abuse ([facility]        Status EXAM:  Status and Exam  Normal: Yes  Facial Expression: Flat  Affect: Appropriate  Level of Consciousness: Alert  Mood:Normal: No  Mood: Depressed,Anxious  Motor Activity:Normal: No  Motor Activity: Unusual Posture/Gait  Interview Behavior: Cooperative  Preception: [place] to Person,[place] to Time,[place] to Place,[place] to Situation  Attention:Normal: No  Attention: Hyperalert,Unable to Concentrate  Thought Processes: Other(See comment) (linear)  Thought Content:Normal: No  Thought Content: Poverty of Content  Hallucinations: None  Delusions: No  Memory:Normal: No  Memory: Poor Recent,Poor Remote  Insight and Judgment: No  Insight and Judgment: Poor Judgment,Poor Insight  Present Suicidal Ideation: No  Present Homicidal Ideation: No    Tobacco Screening:  Practical Counseling, on admission, gabriela X, if applicable and completed (first 3 are required if patient doesn't refuse):            ( )  Recognizing danger situations (included triggers and roadblocks)                    ( )  Coping skills (new ways to manage stress, exercise, relaxation techniques, changing routine, distraction)                                                           ( )  Basic information about quitting (benefits of quitting, techniques in how to quit, available resources  ( ) Referral for counseling faxed to [facility]                                           (x ) Patient refused counseling  ( ) Patient has not smoked in the last 30 days    Metabolic Screening:    No results found for: LABA1C    No results found for: CHOL  No results found for: TRIG  No results found for: HDL  No components found for: LDLCAL  No results found for: LABVLDL      Body mass index is 27.12 kg/m². BP Readings from Last 2 Encounters:   04/09/22 (!) 140/78   12/20/21 116/63           Pt admitted with followings belongings:  Dental Appliances: None  Vision - Corrective Lenses: Glasses  Hearing Aid: None  Jewelry: None  Body Piercings Removed: N/A  Clothing: Virl Dash / [employer] So  Were All Patient Medications Collected?: Not Applicable  Other Valuables: Cell phone,Wallet     Patient's home medications need to be verified [facility], [facility] and Methadone with [facility]. Patient oriented to surroundings and program expectations and copy of patient rights given. Received admission packet:  YES. Consents reviewed, signed YES. . Patient verbalize understanding:  YES. Patient education on precautions: YES                   Patient brought self to the ED for Suicidal ideation to harm self after relapse on heroin and pending legal issues for parole violation. Patient brought over in wheelchair from the [facility] to the [place] unit for admission: Patient pleasant and cooperative, answered all admission questions; patient states adequate diet, sleep, hygiene; has track marks from heroin use on bilateral arms arms. States alcohol, THC, and heroin use. Pending legal issues for parole violation. Denies suicidal/homicidal ideation, states anxiety+depression, denies audio/visual hallucinations.    Ting Steel RN

## 2022-04-09 NOTE — BH NOTE
Patient given tobacco quitline number 16884706779 at this time, refusing to call at this time, states \" I just dont want to quit now\"- patient given information as to the dangers of long term tobacco use. Continue to reinforce the importance of tobacco cessation.

## 2022-04-09 NOTE — BH NOTE
Patient presented to the [email or web address]

## 2022-04-09 NOTE — ED PROVIDER NOTES
MEDICATIONS       Current Discharge Medication List      CONTINUE these medications which have NOT CHANGED    Details   OLANZapine (ZYPREXA) 15 MG tablet Take 1 tablet by mouth nightly  Qty: 30 tablet, Refills: 3      cloNIDine (CATAPRES) 0.1 MG tablet Take 1 tablet by mouth 3 times daily as needed for Other (Withdrawal)  Qty: 60 tablet, Refills: 0           ALLERGIES     has No Known Allergies. FAMILY HISTORY     He indicated that the status of his father is unknown. He indicated that the status of his other is unknown. SOCIAL HISTORY       Social History     Tobacco Use    Smoking status: Current Every Day Smoker    Smokeless tobacco: Current User     Types: Chew    Tobacco comment: refuses nicotine replacement   Vaping Use    Vaping Use: Not on file   Substance Use Topics    Alcohol use: Yes     Alcohol/week: 0.0 standard drinks     Comment: up to 12 Beers/days    Drug use: Yes     Types: IV, Marijuana Doris Awkward)     Comment: Heroin (on methadone)     PHYSICAL EXAM     INITIAL VITALS: /79   Pulse 69   Temp 98.3 °F (36.8 °C) (Oral)   Resp 18   Ht 6' (1.829 m)   Wt 200 lb (90.7 kg)   SpO2 97%   BMI 27.12 kg/m²    Physical Exam  Vitals and nursing note reviewed. Constitutional:       Appearance: Normal appearance. HENT:      Head: Normocephalic and atraumatic. Nose: Nose normal.   Eyes:      Extraocular Movements: Extraocular movements intact. Pupils: Pupils are equal, round, and reactive to light. Cardiovascular:      Rate and Rhythm: Normal rate and regular rhythm. Pulmonary:      Effort: Pulmonary effort is normal. No respiratory distress. Breath sounds: Normal breath sounds. Abdominal:      General: Abdomen is flat. There is no distension. Palpations: Abdomen is soft. There is no mass. Musculoskeletal:         General: No swelling. Normal range of motion. Cervical back: Normal range of motion. No rigidity. Skin:     General: Skin is warm and dry. Neurological:      General: No focal deficit present. Mental Status: He is alert. Mental status is at baseline. Cranial Nerves: No cranial nerve deficit. Psychiatric:      Comments: +SI         MEDICAL DECISION MAKINyear old male presents for evaluation of suicidal ideation with plan to overdose. Will plan to obtain basic blood work, admit for psychiatric stabilization. Labs unremarkable medically cleared for psychiatric hospitalization         CRITICAL CARE:       PROCEDURES:    Procedures    DIAGNOSTIC RESULTS   EKG:All EKG's are interpreted by the Emergency Department Physician who either signs or Co-signs this chart in the absence of a cardiologist.        RADIOLOGY:All plain film, CT, MRI, and formal ultrasound images (except ED bedside ultrasound) are read by the radiologist, see reports below, unless otherwisenoted in MDM or here. No orders to display     LABS: All lab results were reviewed by myself, and all abnormals are listed below.   Labs Reviewed   CBC WITH AUTO DIFFERENTIAL - Abnormal; Notable for the following components:       Result Value    Hematocrit 39.2 (*)     All other components within normal limits   COMPREHENSIVE METABOLIC PANEL W/ REFLEX TO MG FOR LOW K - Abnormal; Notable for the following components:    Glucose 106 (*)     Total Bilirubin 0.23 (*)     All other components within normal limits   URINALYSIS WITH MICROSCOPIC - Abnormal; Notable for the following components:    Leukocyte Esterase, Urine TRACE (*)     All other components within normal limits   URINE DRUG SCREEN - Abnormal; Notable for the following components:    Amphetamine Screen, Ur POSITIVE (*)     Methadone Screen, Urine POSITIVE (*)     Cannabinoid Scrn, Ur POSITIVE (*)     All other components within normal limits   COVID-19, RAPID   ETHANOL       EMERGENCY DEPARTMENTCOURSE:         Vitals:    Vitals:    22 1358   BP: 123/79   Pulse: 69   Resp: 18   Temp: 98.3 °F (36.8 °C)   TempSrc: Oral   SpO2: 97%   Weight: 200 lb (90.7 kg)   Height: 6' (1.829 m)       The patient was given the following medications while in the emergency department:  Orders Placed This Encounter   Medications    acetaminophen (TYLENOL) tablet 650 mg    ibuprofen (ADVIL;MOTRIN) tablet 400 mg    aluminum & magnesium hydroxide-simethicone (MAALOX) 200-200-20 MG/5ML suspension 30 mL    hydrOXYzine (ATARAX) tablet 50 mg    nicotine (NICODERM CQ) 14 MG/24HR 1 patch    polyethylene glycol (GLYCOLAX) packet 17 g    traZODone (DESYREL) tablet 50 mg     CONSULTS:  IP CONSULT TO INTERNAL MEDICINE    FINAL IMPRESSION      1. Suicidal ideation          DISPOSITION/PLAN   DISPOSITION Decision To Admit 04/09/2022 02:54:01 PM      PATIENT REFERRED TO:  No follow-up provider specified. DISCHARGE MEDICATIONS:  Current Discharge Medication List        The care is provided during an unprecedented national emergency due to the novel coronavirus, COVID 19.   MD Larry Collins MD  04/09/22 9059

## 2022-04-09 NOTE — BH NOTE
Patient denies suicidal ideations on admission and contracts for safety while on the unit. Patient does not feel the need to harm himself - Suicide Precautions discontinued.

## 2022-04-09 NOTE — CARE COORDINATION
BHI Biopsychosocial Assessment    Current Level of Psychosocial Functioning      Independent   Dependent  X  Minimal Assist      Comments:   Patient has a principle diagnosis of Depression with suicidal ideations, which is the condition established to be chiefly responsible for the admission of the client on this date. Patient documentation in Epic provides prior diagnoses of Bipolar I disorder, current episode depressed, severe; PTSD; Heroin-use disorder, severe    Psychosocial High-Risk Factors (check all that apply)     Unable to obtain meds   Chronic illness/pain    Not taking medications X  Substance abuse X  Lack of Family Support   Addictive Behaviors  Financial stress X  Isolation  Inadequate Community Resources  Suicide attempt(s) X  Homicidal ideations  Self-mutilation  Victim of crime   Legal issues  Developmental Delay  Unable to manage personal needs    Age 72 or older   Homeless  No transportation   Readmission within 30 days   Unemployment X  Traumatic Event X    Psychiatric Advanced Directives:   Nothing reported     Family to Involve in Treatment:  Mother listed as emergency contact, Francois Sat at [phone] and both mother and father are supportive     Sexual Orientation:   Single male     Patient Strengths:  [place] [employer], housing on/off with parents     Patient Barriers:   HX of suicide attempts, HX of psychiatric hospitalizations, recent relapse, legal issues, no legal form of income, HX of being in [facility]    Trauma and Abuse/History of Violence:  Nothing reported     Opiate/AOD Referral and/or Education Provided:   HX of Heroin/Fentanyl abuse, recent relapse on 4/2 on Fentanyl and meth after being clean for 2-weeks while in Methadone program for 4-5 months in [place].       CMHC/mental health history:   [facility]      Plan of Care:  Medication management, group/individual therapies, family meetings, psychoeducation, 1:1 counseling, treatment team meetings to assist with stabilization      Initial Discharge Plan:  Stabilize mood and medications; explore social support systems within community to increase socialization; provide 24/7 local and national hotline numbers for additional support; safety plan to be completed; disclose/discuss suicidal ideas will improve, decrease depressive symptoms, absence of self-harm. Discharge Location:  D/C location TBD; follow-up at [facility] and confirm Methadone treatment     Clinical Summary:   PT is met on this date and time and is A&Ox4. PT was cooperative during assessment, presents with intermittent eye contact and affect was constricted. PT's memory is intact, remote, and recent. PT is in good behavioral control. No evidence of thought disturbance. Mood is depressed, affect constricted as he would not allow himself to express the sadness associated with his recent relapse. PT was last at the [facility] 12/14/2021 for 6-days. Peter Triplett is a 36-year old male who voluntarily presents to [facility] ED due to an increase in suicidal ideations w/plan to either hang himself or overdose on drugs. PT reports multiple stressors from his family and parole office. PT reports he has been getting Methadone services in [place], [place] for the past 4-months and relapsed a week ago using Fentanyl and meth. PT is unclear if hallucinations, both auditory and visual, are real or \"in my mind. \"  PT is unclear on what he is seeing or what is being told to him. PT reports he has not been compliant with his psychotropic medications and not having them monitored or distributed by a psychiatrist or CNP. PT denies HI. Pt lives in [facility]. He reports that he is currently on [place] after being released from a 4-year [employer] sentence in [place] for drug trafficking. He reports that he was released to a [facility], because he was told by his  that he is unable to return home.  PT has a long history of drug use and first [facility] admission was in 2015.

## 2022-04-09 NOTE — ED NOTES
Provisional Diagnosis:   Bipolar Disorder    Psychosocial and Contextual Factors:   Patient reports an increase in depression and suicdial ideation for the last several weeks. Patient reports daily drug use since Saturday. Patient states he is on parole. C-SSRS Summary:      Patient: X  Family:   Agency:     Substance Abuse: Patient reports relapsed on Saturday, after being clean for two weeks. Patient using Fentanyl and Meth. Patient states he was being prescribed Methadone for 4-5 month in [place]. Present Suicidal Behavior:  Patient reports    Verbal:     Attempt:    Past Suicidal Behavior: Patient reports multiple past suicdie attempt, and states in [facility] he \"swallowed razor blades\" in an attempt to kill self. Verbal:X    Attempt:X      Self-Injurious/Self-Mutilation:Patient denies. Violence Current or Past: Patient is calm and cooperative while in the ED. Trauma Identified:  Patient denies. Risk Factors:    Patient reports no income. Clinical Summary:    Minh Crawford is a 28 y.o. male who presents to the ED for suicdial ideation with a plan to hang self or overdose. Patient states \"I have just been trying to work up the courage to do it, and I have been trying to overdose but it hasn't worked yet. \"  Patient states \"there is just no way out. Everyone is pushing me into a corner\" by his  and family. Patient states he was clean from drug for two weeks, and relapsed on Saturday, and states he has been using Fentanyl and Meth everyday since. Patient states he is also linked with Methadone services in [facility]. Patient reports unstable housing, but states he does live with his parents for the most part. Patient reports a history of auditory and visual hallucinations, and states they have been getting worse for the last week. Patient reports increase in paranoid thoughts that \"people are trying to kill me. \"  Patient states he has been inconsistently been taking his Zyprexa, which is prescribed by a PCP. Patient states he has homicidal ideation, but denies any plan/intent to do so, and denies that thoughts are towards anyone specific. Patient displays no abnormal movements, speech rate, or tone. Articulations were within normal limits. Patients memory was intact. Thought form was linear. Patient denies obsessions or compulsions. Level of Care Disposition:    Writer consulted with Dr. Floyd Baca who recommends inpatient psychiatric admission for safety and stabilization. Patient is in agreement and signed application for voluntary admission.

## 2022-04-09 NOTE — ED NOTES
Mode of arrival:  Parents brought pt in      Residence prior to admit: home with parents      Chief complaint on admission: suicidal  Pt states that he is suicidal with a plan to overdose or hang self. Pt admits to being on methadone for the last 4 months, yet relapsed last Saturday. Last use of heroin was yesterday. Pt states that he has some recent legal troubles that have increased his depression. Pt does appear restless with some slight movements in extremities. Pt is A&Ox4, pleasant, and cooperative. No distress noted at this time. C= \"Have you ever felt that you should Cut down on your drinking? \"  No  A= \"Have people Annoyed you by criticizing your drinking? \"  No  G= \"Have you ever felt bad or Guilty about your drinking? \"  No  E= \"Have you ever had a drink as an Eye-opener first thing in the morning to steady your nerves or to help a hangover? \"  No      Deferred []      Reason for deferring: N/A    *If yes to two or more: probable alcohol abuse. *         Blanca Denton RN  04/09/22 0962

## 2022-04-10 PROBLEM — F15.10 METHAMPHETAMINE ABUSE (HCC): Status: ACTIVE | Noted: 2022-04-10

## 2022-04-10 PROBLEM — F11.10 OPIOID ABUSE, DAILY USE (HCC): Status: ACTIVE | Noted: 2022-04-10

## 2022-04-10 PROBLEM — F33.3 MAJOR DEPRESSIVE DISORDER, RECURRENT, SEVERE WITH PSYCHOTIC FEATURES (HCC): Status: ACTIVE | Noted: 2022-04-10

## 2022-04-10 PROCEDURE — APPSS60 APP SPLIT SHARED TIME 46-60 MINUTES: Performed by: NURSE PRACTITIONER

## 2022-04-10 PROCEDURE — 99223 1ST HOSP IP/OBS HIGH 75: CPT | Performed by: INTERNAL MEDICINE

## 2022-04-10 PROCEDURE — 90792 PSYCH DIAG EVAL W/MED SRVCS: CPT | Performed by: PSYCHIATRY & NEUROLOGY

## 2022-04-10 PROCEDURE — 6370000000 HC RX 637 (ALT 250 FOR IP): Performed by: PSYCHIATRY & NEUROLOGY

## 2022-04-10 PROCEDURE — 1240000000 HC EMOTIONAL WELLNESS R&B

## 2022-04-10 RX ORDER — CLONIDINE HYDROCHLORIDE 0.1 MG/1
0.1 TABLET ORAL EVERY 4 HOURS PRN
Status: DISCONTINUED | OUTPATIENT
Start: 2022-04-10 | End: 2022-04-13 | Stop reason: HOSPADM

## 2022-04-10 RX ORDER — ONDANSETRON 4 MG/1
4 TABLET, FILM COATED ORAL EVERY 8 HOURS PRN
Status: DISCONTINUED | OUTPATIENT
Start: 2022-04-10 | End: 2022-04-13 | Stop reason: HOSPADM

## 2022-04-10 RX ORDER — CYCLOBENZAPRINE HCL 10 MG
10 TABLET ORAL 3 TIMES DAILY PRN
Status: DISCONTINUED | OUTPATIENT
Start: 2022-04-10 | End: 2022-04-13 | Stop reason: HOSPADM

## 2022-04-10 RX ORDER — OLANZAPINE 5 MG/1
5 TABLET ORAL NIGHTLY
Status: DISCONTINUED | OUTPATIENT
Start: 2022-04-10 | End: 2022-04-13 | Stop reason: HOSPADM

## 2022-04-10 RX ORDER — DICYCLOMINE HYDROCHLORIDE 10 MG/1
20 CAPSULE ORAL 4 TIMES DAILY PRN
Status: DISCONTINUED | OUTPATIENT
Start: 2022-04-10 | End: 2022-04-13 | Stop reason: HOSPADM

## 2022-04-10 RX ADMIN — NICOTINE POLACRILEX 2 MG: 2 GUM, CHEWING BUCCAL at 08:24

## 2022-04-10 NOTE — BH NOTE
Discussed with patient his mother calling and patient reports his mother does know he was here from him but did not wish to sign a release of information for his mother, he stated, Jodi Ulloa gets too involved\". Patient understands he will need to provide his mother his four digit security code when he calls her next so she can call him.

## 2022-04-10 NOTE — H&P
[facility] Internal Medicine  Sharifa Varghese MD; Damian Tse MD; Taj Tidwell MD; MD Jarad Delcid MD; MD PAULY Valdes [facility] Internal Medicine   [facility]    HISTORY AND PHYSICAL EXAMINATION            Date:   4/10/2022  Patient name:  Kaycee Benitez  Date of admission:  4/9/2022  1:39 PM  MRN:   [id number]  Account:  [de-identified]  YOB: 1990  PCP:    No primary care provider on file. Room:   [address] [place] [address]  Code Status:    Full Code    Chief Complaint:     Chief Complaint   Patient presents with    Suicidal     plan on overdose meth/heroin or hang self   Street drug abuse intravenous drugs smoker    History Obtained From:     Patient medical record nursing staff    History of Present Illness:     Kaycee Benitez is a 28 y.o. Unavailable / unknown male who presents with Suicidal (plan on overdose meth/heroin or hang self)   and is admitted to the hospital for the management of Depression with suicidal ideation. 60-year-old gentleman who is a smoker half pack a day history of intravenous drug abuse for many years has been in and out of the prison for last 10 years with active drug use multiple needle track marks noted on the arms patient claims to be on methadone 150 mg daily denies any fever chills no history of endocarditis no history of osteomyelitis    Past Medical History:     Past Medical History:   Diagnosis Date    [facility] disorder ([facility] [address]     Depression     Substance abuse ([facility] [address]         Past Surgical History:     History reviewed. No pertinent surgical history. Medications Prior to Admission:     Prior to Admission medications    Medication Sig Start Date End Date Taking?  Authorizing Provider   OLANZapine (ZYPREXA) 15 MG tablet Take 1 tablet by mouth nightly 12/20/21   Erica Tobar MD   cloNIDine (CATAPRES) 0.1 MG tablet Take 1 tablet by mouth 3 times daily as needed for Other (Withdrawal)  Patient not taking: Reported on 21   Louise Madrid MD        Allergies:     Patient has no known allergies. Social History:     Tobacco:    reports that he has been smoking. His smokeless tobacco use includes chew. Alcohol:      reports current alcohol use. Drug Use:  reports current drug use. Drugs: IV and Marijuana (Robert Fought). Family History:     Family History   Problem Relation Age of Onset    Depression Other         uncle, commited suicide.  Alcohol Abuse Other         uncle    Alcohol Abuse Father        Review of Systems:     Positive and Negative as described in HPI. CONSTITUTIONAL:  negative for fevers, chills, sweats, fatigue, weight loss  HEENT:  negative for vision, hearing changes, runny nose, throat pain  RESPIRATORY:  negative for shortness of breath, cough, congestion, wheezing  CARDIOVASCULAR:  negative for chest pain, palpitations  GASTROINTESTINAL:  negative for nausea, vomiting, diarrhea, constipation, change in bowel habits, abdominal pain   GENITOURINARY:  negative for difficulty of urination, burning with urination, frequency   INTEGUMENT:  negative for rash, skin lesions, easy bruising   HEMATOLOGIC/LYMPHATIC:  negative for swelling/edema   ALLERGIC/IMMUNOLOGIC:  negative for urticaria , itching  ENDOCRINE:  negative increase in drinking, increase in urination, hot or cold intolerance  MUSCULOSKELETAL:  negative joint pains, muscle aches, swelling of joints  NEUROLOGICAL:  negative for headaches, dizziness, lightheadedness, numbness, pain, tingling extremities      Physical Exam:   /76   Pulse 60   Temp 97.3 °F (36.3 °C) (Oral)   Resp 14   Ht 6' (1.829 m)   Wt 200 lb (90.7 kg)   SpO2 99%   BMI 27.12 kg/m²   Temp (24hrs), Av.6 °F (36.4 °C), Min:96.9 °F (36.1 °C), Max:98.3 °F (36.8 °C)    No results for input(s): POCGLU in the last 72 hours.   No intake or output data in the 24 hours ending 04/10/22 1217    General Appearance: alert, well appearing, and in no acute distress  Mental status: oriented to person, place, and time  Head: normocephalic, atraumatic  Eye: no icterus, redness, pupils equal and reactive, extraocular eye movements intact, conjunctiva clear  Ear: normal external ear, no discharge, hearing intact  Nose: no drainage noted  Mouth: mucous membranes moist  Neck: supple, no carotid bruits, thyroid not palpable  Lungs: Bilateral equal air entry, clear to ausculation, no wheezing, rales or rhonchi, normal effort  Cardiovascular: normal rate, regular rhythm, no murmur, gallop, rub  Abdomen: Soft, nontender, nondistended, normal bowel sounds, no hepatomegaly or splenomegaly  Neurologic: There are no new focal motor or sensory deficits, normal muscle tone and bulk, no abnormal sensation, normal speech, cranial nerves II through XII grossly intact  Skin: No gross lesions, rashes, bruising or bleeding on exposed skin area  Extremities: peripheral pulses palpable, no pedal edema or calf pain with palpation  Multiple needle track marks noted on the arms from intravenous drug abuse    Investigations:      Laboratory Testing:  Recent Results (from the past 24 hour(s))   CBC with Auto Differential    Collection Time: 04/09/22  2:06 PM   Result Value Ref Range    WBC 5.8 3.5 - 11.0 k/uL    RBC 4.72 4.5 - 5.9 m/uL    Hemoglobin 13.8 13.5 - 17.5 g/dL    Hematocrit 39.2 (L) 41 - 53 %    MCV 83.2 80 - 100 fL    MCH 29.3 26 - 34 pg    MCHC 35.2 31 - 37 g/dL    RDW 13.3 11.5 - 14.9 %    Platelets 848 495 - 241 k/uL    MPV 7.4 6.0 - 12.0 fL    Seg Neutrophils 48 36 - 66 %    Lymphocytes 41 24 - 44 %    Monocytes 7 1 - 7 %    Eosinophils % 3 0 - 4 %    Basophils 1 0 - 2 %    Segs Absolute 2.80 1.3 - 9.1 k/uL    Absolute Lymph # 2.40 1.0 - 4.8 k/uL    Absolute Mono # 0.40 0.1 - 1.3 k/uL    Absolute Eos # 0.20 0.0 - 0.4 k/uL    Basophils Absolute 0.00 0.0 - 0.2 k/uL   Comprehensive Metabolic Panel w/ Reflex to MG    Collection Time: 04/09/22  2:06 PM Result Value Ref Range    Glucose 106 (H) 70 - 99 mg/dL    BUN 7 6 - 20 mg/dL    CREATININE 0.81 0.70 - 1.20 mg/dL    Calcium 9.3 8.6 - 10.4 mg/dL    Sodium 139 135 - 144 mmol/L    Potassium 4.1 3.7 - 5.3 mmol/L    Chloride 103 98 - 107 mmol/L    CO2 23 20 - 31 mmol/L    Anion Gap 13 9 - 17 mmol/L    Alkaline Phosphatase 80 40 - 129 U/L    ALT 24 5 - 41 U/L    AST 19 <40 U/L    Total Bilirubin 0.23 (L) 0.3 - 1.2 mg/dL    Total Protein 7.4 6.4 - 8.3 g/dL    Albumin 4.3 3.5 - 5.2 g/dL    GFR Non-African American >60 >60 mL/min    GFR African American >60 >60 mL/min    GFR Comment         Ethanol    Collection Time: 04/09/22  2:06 PM   Result Value Ref Range    Ethanol <10 <10 mg/dL    Ethanol percent <0.010 %   Urinalysis with Microscopic    Collection Time: 04/09/22  2:10 PM   Result Value Ref Range    Color, UA Yellow Yellow    Turbidity UA Clear Clear    Glucose, Ur NEGATIVE NEGATIVE    Bilirubin Urine NEGATIVE NEGATIVE    Ketones, Urine NEGATIVE NEGATIVE    Specific Gravity, UA 1.008 1.000 - 1.030    Urine Hgb NEGATIVE NEGATIVE    pH, UA 7.0 5.0 - 8.0    Protein, UA NEGATIVE NEGATIVE    Urobilinogen, Urine Normal Normal    Nitrite, Urine NEGATIVE NEGATIVE    Leukocyte Esterase, Urine TRACE (A) NEGATIVE    WBC, UA 0 TO 2 /HPF    RBC, UA 0 TO 2 /HPF    Casts UA 0 TO 2 /LPF    Epithelial Cells UA 0 TO 2 /HPF    Bacteria, UA None None   Urine Drug Screen    Collection Time: 04/09/22  2:10 PM   Result Value Ref Range    Amphetamine Screen, Ur POSITIVE (A) NEGATIVE    Barbiturate Screen, Ur NEGATIVE NEGATIVE    Benzodiazepine Screen, Urine NEGATIVE NEGATIVE    Cocaine Metabolite, Urine NEGATIVE NEGATIVE    Methadone Screen, Urine POSITIVE (A) NEGATIVE    Opiates, Urine NEGATIVE NEGATIVE    Phencyclidine, Urine NEGATIVE NEGATIVE    Cannabinoid Scrn, Ur POSITIVE (A) NEGATIVE    Oxycodone Screen, Ur NEGATIVE NEGATIVE    Test Information       Assay provides medical screening only.   The absence of expected drug(s) and/or metabolite(s) may indicate diluted or adulterated urine, limitations of testing or timing of collection. SARS-CoV-2 NAAT (Rapid)    Collection Time: 04/09/22  2:12 PM    Specimen: Nasopharyngeal Swab   Result Value Ref Range    Specimen Description . NASOPHARYNGEAL SWAB     SARS-CoV-2, Rapid Not Detected Not Detected       Imaging/Diagnostics:  No results found. Assessment :      Hospital Problems           Last Modified POA    * (Principal) Depression with suicidal ideation 4/9/2022 Yes          Plan:     55-year-old gentleman who is a smoker half pack a day history of intravenous drug abuse for many years has been in and out of the residential for last 10 years with active drug use multiple needle track marks noted on the arms patient claims to be on methadone 150 mg daily denies any fever chills no history of endocarditis no history of osteomyelitis  Will order CBC and CRP if elevated medially echocardiogram      Tatum Steinberg MD  4/10/2022  12:17 PM    Copy sent to Dr. Markos Paulino primary care provider on file. Please note that this chart was generated using voice recognition Dragon dictation software. Although every effort was made to ensure the accuracy of this automated transcription, some errors in transcription may have occurred.
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
## 2022-04-10 NOTE — PLAN OF CARE
Problem: Altered Mood, Depressive Behavior:  Goal: Able to verbalize acceptance of life and situations over which he or she has no control  Description: Able to verbalize acceptance of life and situations over which he or she has no control  4/10/2022 1254 by Herbie Vargas RN  Outcome: Ongoing  Note: Patient isolative to self, cooperative with care, minimally interactive with peers and staff when out    Problem: Tobacco Use:  Goal: Inpatient tobacco use cessation counseling participation  Description: Inpatient tobacco use cessation counseling participation  4/10/2022 1254 by Herbie Vargas RN  Outcome: Ongoing

## 2022-04-10 NOTE — PROGRESS NOTES
Behavioral Services  Medicare Certification Upon Admission    I certify that this patient's inpatient psychiatric hospital admission is medically necessary for:    [x] (1) Treatment which could reasonably be expected to improve this patient's condition,       [x] (2) Or for diagnostic study;     AND     [x](2) The inpatient psychiatric services are provided while the individual is under the care of a physician and are included in the individualized plan of care.     Estimated length of stay/service 4 to 7 days    Plan for post-hospital care Home with outpatient community mental health follow-up versus inpatient rehab    Electronically signed by Marylee Hering, MD on 4/10/2022 at 11:29 AM

## 2022-04-10 NOTE — PLAN OF CARE
Problem: Altered Mood, Depressive Behavior:  Goal: Able to verbalize acceptance of life and situations over which he or she has no control  Description: Able to verbalize acceptance of life and situations over which he or she has no control  Outcome: Ongoing     Problem: Tobacco Use:  Goal: Inpatient tobacco use cessation counseling participation  Description: Inpatient tobacco use cessation counseling participation  Outcome: Ongoing   Patient denies suicidal ideas at this time. Patient denies homicidal ideas at this time. Patient denies depressive symptoms at this time. Patient has been out in day room watching tv and socializing with peers. Patient is free of self harm at this time. Patient agrees to seek out staff if thoughts to harm self arise. Staff will provide support and reassurance as needed. Safety checks maintained every 15 minutes.

## 2022-04-10 NOTE — BH NOTE
-Patient's mom states he can not come back home on discharge, she is looking for outpatient treatment options for her son. Per mother she will be attempting to get guardianship. Mother did not have patient's four digit code and writer was not able to verify patient was on the unit. Mother provided this information without confirming her son was even on the unit. Mother would like to be involved in patients discharge planning, her phone number is [phone], she understands her sons information can not be shared without either a release of information or guardianship.

## 2022-04-10 NOTE — PLAN OF CARE
[facility]  Initial Interdisciplinary Treatment Plan NOTE      Original treatment plan Date & Time: 4/10/2022                1150am    Admission Type:  Admission Type: Voluntary    Reason for admission:   Reason for Admission: Suicidal to overdose or harm self    Estimated Length of Stay:  5-7days  Estimated Discharge Date: to be determined by physician    PATIENT STRENGTHS:  Patient Strengths:Strengths: Communication,Positive Support  Patient Strengths and Limitations:Limitations: Inappropriate/potentially harmful leisure interests,Difficulty problem solving/relies on others to help solve problems,Multiple barriers to leisure interests,General negative or hopeless attitude about future/recovery,Tendency to isolate self  Addictive Behavior: Addictive Behavior  In the past 3 months, have you felt or has someone told you that you have a problem with:  : None  Do you have a history of Chemical Use?: Yes  Do you have a history of Alcohol Use?: No  Do you have a history of Street Drug Abuse?: Yes  Histroy of Prescripton Drug Abuse?: No  Medical Problems:  Past Medical History:   Diagnosis Date    Bipolar disorder ([facility]     Depression     Substance abuse ([facility]      Status EXAM:Status and Exam  Normal: Yes  Facial Expression: Flat  Affect: Appropriate  Level of Consciousness: Alert  Mood:Normal: Yes  Mood: Depressed,Anxious  Motor Activity:Normal: Yes  Motor Activity: Unusual Posture/Gait  Interview Behavior: Cooperative  Preception: [place] to Person,[place] to Place,[place] to Time,[place] to Situation  Attention:Normal: No  Attention: Distractible  Thought Processes: Other(See comment) (linear)  Thought Content:Normal: No  Thought Content: Preoccupations  Hallucinations: None  Delusions: No  Memory:Normal: Yes  Memory: Poor Recent,Poor Remote  Insight and Judgment: No  Insight and Judgment: Poor Insight  Present Suicidal Ideation: No  Present Homicidal Ideation: No    EDUCATION:   Learner Progress Toward Treatment Goals: reviewed group plans and strategies for care    Method:group therapy, medication compliance, individualized assessments and care planning    Outcome: needs reinforcement    PATIENT GOALS: to be discussed with patient within 72 hours    PLAN/TREATMENT RECOMMENDATIONS:     continue group therapy , medications compliance, goal setting, individualized assessments and care, continue to monitor pt on unit      SHORT-TERM GOALS:   Time frame for Short-Term Goals: 5-7 days    LONG-TERM GOALS:  Time frame for Long-Term Goals: 6 months  Members Present in Team Meeting: See Signature Sheet    Rhonda Snyder

## 2022-04-10 NOTE — GROUP NOTE
Group Therapy Note    Date: 4/10/2022    Group Start Time: 1400  Group End Time: 1185  Group Topic: Cognitive Skills    MANAN Cesar, CTRS        Group Therapy Note    Attendees: 8/16         Pt did not participate in Cognitive Skills Group at 1400 when encouraged by RT due to pt resting in room. Pt was offered talk time as an alternative to group but declined.          Discipline Responsible: Psychoeducational Specialist        Signature:  Bruna Forrest

## 2022-04-11 PROCEDURE — 1240000000 HC EMOTIONAL WELLNESS R&B

## 2022-04-11 PROCEDURE — APPSS30 APP SPLIT SHARED TIME 16-30 MINUTES: Performed by: NURSE PRACTITIONER

## 2022-04-11 PROCEDURE — 6370000000 HC RX 637 (ALT 250 FOR IP): Performed by: PSYCHIATRY & NEUROLOGY

## 2022-04-11 PROCEDURE — 99232 SBSQ HOSP IP/OBS MODERATE 35: CPT | Performed by: INTERNAL MEDICINE

## 2022-04-11 PROCEDURE — 99232 SBSQ HOSP IP/OBS MODERATE 35: CPT | Performed by: PSYCHIATRY & NEUROLOGY

## 2022-04-11 RX ORDER — METHADONE HYDROCHLORIDE 10 MG/5ML
150 SOLUTION ORAL DAILY
Status: DISCONTINUED | OUTPATIENT
Start: 2022-04-11 | End: 2022-04-13 | Stop reason: HOSPADM

## 2022-04-11 RX ORDER — METHADONE HYDROCHLORIDE 10 MG/5ML
120 SOLUTION ORAL ONCE
Status: DISCONTINUED | OUTPATIENT
Start: 2022-04-11 | End: 2022-04-11

## 2022-04-11 RX ORDER — METHADONE HYDROCHLORIDE 10 MG/5ML
150 SOLUTION ORAL EVERY 24 HOURS
COMMUNITY

## 2022-04-11 RX ADMIN — METHADONE HYDROCHLORIDE 150 MG: 10 SOLUTION ORAL at 12:25

## 2022-04-11 RX ADMIN — DICYCLOMINE HYDROCHLORIDE 20 MG: 10 CAPSULE ORAL at 08:20

## 2022-04-11 RX ADMIN — OLANZAPINE 5 MG: 5 TABLET, FILM COATED ORAL at 20:37

## 2022-04-11 RX ADMIN — NICOTINE POLACRILEX 2 MG: 2 GUM, CHEWING BUCCAL at 15:30

## 2022-04-11 RX ADMIN — NICOTINE POLACRILEX 2 MG: 2 GUM, CHEWING BUCCAL at 16:20

## 2022-04-11 RX ADMIN — HYDROXYZINE HYDROCHLORIDE 50 MG: 50 TABLET, FILM COATED ORAL at 08:20

## 2022-04-11 RX ADMIN — NICOTINE POLACRILEX 2 MG: 2 GUM, CHEWING BUCCAL at 08:20

## 2022-04-11 RX ADMIN — NICOTINE POLACRILEX 2 MG: 2 GUM, CHEWING BUCCAL at 18:13

## 2022-04-11 ASSESSMENT — PAIN SCALES - GENERAL
PAINLEVEL_OUTOF10: 5
PAINLEVEL_OUTOF10: 0

## 2022-04-11 NOTE — PROGRESS NOTES
Daily Progress Note  4/11/2022    Patient Name: Shade Jamil: Suicidal ideation with plan to overdose or hang self         SUBJECTIVE:   Patient was seen for follow-up assessment in his room today. Nursing staff report patient has been cooperative, isolative, behaviorally in control and pleasant during interactions with peers and staff. He was resting in bed on approach but awake and agreeable to conversation. Patient reports he is eager to restart methadone once verified today. He continues to experience high levels of depression and endorses ongoing thoughts of wanting to harm himself and to not be alive. Patient was able to get several hours of sleep overnight and did attempt to eat most of his breakfast this morning. Patient endorses anxiety and states \"I just cannot concentrate on anything right now\". Refusal of scheduled Zyprexa last night was discussed and patient reported he did not take it because he was worried it would make him feel worse. He reports he has been spending much of his time in his room lying down and has not felt like attending groups. He denies auditory and visual hallucinations and made no delusional or paranoid statements during conversation. At this time patient is unable to contract for safety in the community and warrants further hospitalization for stabilization, medication management, and therapeutic groups and milieu.     Compliant with scheduled medications: [] Yes    [x] No   Refused Zyprexa    Received emergency medications in past 24 hrs: [] Yes   [x] No    Appetite:  [] Normal/Adequate/Unchanged  [] Increased  [x] Decreased      Sleep:       [x] Normal/Adequate/Unchanged  [] Fair  [] Poor      Group Attendance on Unit:   [] Yes  [] Selectively    [x] No    Medication Side Effects: N/A         Mental Status Exam  Level of consciousness: Alert and awake   Appearance: Appropriate attire for setting, resting in bed, with disheveled grooming and fair hygiene   Behavior/Motor: Approachable, mildly restless  Attitude toward examiner: Cooperative, attentive, fair eye contact   Speech: spontaneous, normal rate and normal volume   Mood: Depressed, anxious  Affect: Mood congruent  Thought processes: linear and coherent  Thought content: Denies homicidal ideation  Suicidal Ideation: Present, unable to contract for safety in community  Delusions: Not evident  Perceptual Disturbance: patient is not observed responding to internal stimuli; denies AVH  Cognition: Oriented to self, location, time, and situation  Memory: intact  Insight & Judgement: poor     Data   height is 6' (1.829 m) and weight is 200 lb (90.7 kg). His temperature is 97.3 °F (36.3 °C). His blood pressure is 140/90 (abnormal) and his pulse is 66. His respiration is 14 and oxygen saturation is 100%.    Labs:   Admission on 04/09/2022   Component Date Value Ref Range Status    WBC 04/09/2022 5.8  3.5 - 11.0 k/uL Final    RBC 04/09/2022 4.72  4.5 - 5.9 m/uL Final    Hemoglobin 04/09/2022 13.8  13.5 - 17.5 g/dL Final    Hematocrit 04/09/2022 39.2* 41 - 53 % Final    MCV 04/09/2022 83.2  80 - 100 fL Final    MCH 04/09/2022 29.3  26 - 34 pg Final    MCHC 04/09/2022 35.2  31 - 37 g/dL Final    RDW 04/09/2022 13.3  11.5 - 14.9 % Final    Platelets 41/79/4304 278  150 - 450 k/uL Final    MPV 04/09/2022 7.4  6.0 - 12.0 fL Final    Seg Neutrophils 04/09/2022 48  36 - 66 % Final    Lymphocytes 04/09/2022 41  24 - 44 % Final    Monocytes 04/09/2022 7  1 - 7 % Final    Eosinophils % 04/09/2022 3  0 - 4 % Final    Basophils 04/09/2022 1  0 - 2 % Final    Segs Absolute 04/09/2022 2.80  1.3 - 9.1 k/uL Final    Absolute Lymph # 04/09/2022 2.40  1.0 - 4.8 k/uL Final    Absolute Mono # 04/09/2022 0.40  0.1 - 1.3 k/uL Final    Absolute Eos # 04/09/2022 0.20  0.0 - 0.4 k/uL Final    Basophils Absolute 04/09/2022 0.00  0.0 - 0.2 k/uL Final    Glucose 04/09/2022 106* 70 - 99 mg/dL Final    BUN 04/09/2022 7 6 - 20 mg/dL Final    CREATININE 04/09/2022 0.81  0.70 - 1.20 mg/dL Final    Calcium 04/09/2022 9.3  8.6 - 10.4 mg/dL Final    Sodium 04/09/2022 139  135 - 144 mmol/L Final    Potassium 04/09/2022 4.1  3.7 - 5.3 mmol/L Final    Chloride 04/09/2022 103  98 - 107 mmol/L Final    CO2 04/09/2022 23  20 - 31 mmol/L Final    Anion Gap 04/09/2022 13  9 - 17 mmol/L Final    Alkaline Phosphatase 04/09/2022 80  40 - 129 U/L Final    ALT 04/09/2022 24  5 - 41 U/L Final    AST 04/09/2022 19  <40 U/L Final    Total Bilirubin 04/09/2022 0.23* 0.3 - 1.2 mg/dL Final    Total Protein 04/09/2022 7.4  6.4 - 8.3 g/dL Final    Albumin 04/09/2022 4.3  3.5 - 5.2 g/dL Final    GFR Non- 04/09/2022 >60  >60 mL/min Final    GFR  04/09/2022 >60  >60 mL/min Final    GFR Comment 04/09/2022        Final    Comment: Average GFR for 30-36 years old:   80 mL/min/1.73sq m  Chronic Kidney Disease:   <60 mL/min/1.73sq m  Kidney failure:   <15 mL/min/1.73sq m              eGFR calculated using average adult body mass.  Additional eGFR calculator available at:        [employer]            Color, UA 04/09/2022 Yellow  Yellow Final    Turbidity UA 04/09/2022 Clear  Clear Final    Glucose, Ur 04/09/2022 NEGATIVE  NEGATIVE Final    Bilirubin Urine 04/09/2022 NEGATIVE  NEGATIVE Final    Ketones, Urine 04/09/2022 NEGATIVE  NEGATIVE Final    Specific Gravity, UA 04/09/2022 1.008  1.000 - 1.030 Final    Urine Hgb 04/09/2022 NEGATIVE  NEGATIVE Final    pH, UA 04/09/2022 7.0  5.0 - 8.0 Final    Protein, UA 04/09/2022 NEGATIVE  NEGATIVE Final    Urobilinogen, Urine 04/09/2022 Normal  Normal Final    Nitrite, Urine 04/09/2022 NEGATIVE  NEGATIVE Final    Leukocyte Esterase, Urine 04/09/2022 TRACE* NEGATIVE Final    WBC, UA 04/09/2022 0 TO 2  /HPF Final    RBC, UA 04/09/2022 0 TO 2  /HPF Final    Casts UA 04/09/2022 0 TO 2  /LPF Final    Epithelial Cells UA 04/09/2022 0 TO 2  /HPF Final    Bacteria, UA 04/09/2022 None  None Final    Ethanol 04/09/2022 <10  <10 mg/dL Final    Ethanol percent 04/09/2022 <0.010  % Final    Amphetamine Screen, Ur 04/09/2022 POSITIVE* NEGATIVE Final    Comment:       (Positive cutoff 1000 ng/mL)                  Barbiturate Screen, Ur 04/09/2022 NEGATIVE  NEGATIVE Final    Comment:       (Positive cutoff 200 ng/mL)                  Benzodiazepine Screen, Urine 04/09/2022 NEGATIVE  NEGATIVE Final    Comment:       (Positive cutoff 200 ng/mL)                  Cocaine Metabolite, Urine 04/09/2022 NEGATIVE  NEGATIVE Final    Comment:       (Positive cutoff 300 ng/mL)                  Methadone Screen, Urine 04/09/2022 POSITIVE* NEGATIVE Final    Comment:       (Positive cutoff 300 ng/mL)                  Opiates, Urine 04/09/2022 NEGATIVE  NEGATIVE Final    Comment:       (Positive cutoff 300 ng/mL)                  Phencyclidine, Urine 04/09/2022 NEGATIVE  NEGATIVE Final    Comment:       (Positive cutoff 25 ng/mL)                  Cannabinoid Scrn, Ur 04/09/2022 POSITIVE* NEGATIVE Final    Comment:       (Positive cutoff 50 ng/mL)                  Oxycodone Screen, Ur 04/09/2022 NEGATIVE  NEGATIVE Final    Comment:       (Positive cutoff 100 ng/mL)                  Test Information 04/09/2022 Assay provides medical screening only. The absence of expected drug(s) and/or metabolite(s) may indicate diluted or adulterated urine, limitations of testing or timing of collection. Final    Comment: Testing for legal purposes should be confirmed by another method. To request confirmation   of test result, please call the lab within 7 days of sample submission.  Specimen Description 04/09/2022 . NASOPHARYNGEAL SWAB   Final    SARS-CoV-2, Rapid 04/09/2022 Not Detected  Not Detected Final    Comment:       Rapid NAAT:  The specimen is NEGATIVE for SARS-CoV-2, the novel coronavirus associated with   COVID-19.         The ID NOW COVID-19 assay is designed to detect the virus that causes COVID-19 in patients   with signs and symptoms of infection who are suspected of COVID-19. An individual without symptoms of COVID-19 and who is not shedding SARS-CoV-2 virus would   expect to have a negative (not detected) result in this assay. Negative results should be treated as presumptive and, if inconsistent with clinical signs   and symptoms or necessary for patient management,  should be tested with an alternative molecular assay. Negative results do not preclude   SARS-CoV-2 infection and   should not be used as the sole basis for patient management decisions. Fact sheet for Healthcare Providers: [employer]Her[employer]es  Fact sheet for Patients: BuildHer.es          Methodology: Isothermal Nucleic Acid Amplification           Reviewed patient's current plan of care and vital signs with nursing staff.     Labs reviewed: [x] Yes  Last EKG in EMR reviewed: [x] Yes    Medications  Current Facility-Administered Medications: methadone 10 MG/5ML solution 120 mg, 120 mg, Oral, Once  OLANZapine (ZYPREXA) tablet 5 mg, 5 mg, Oral, Nightly  ondansetron (ZOFRAN) tablet 4 mg, 4 mg, Oral, Q8H PRN  cloNIDine (CATAPRES) tablet 0.1 mg, 0.1 mg, Oral, Q4H PRN  dicyclomine (BENTYL) capsule 20 mg, 20 mg, Oral, 4x Daily PRN  cyclobenzaprine (FLEXERIL) tablet 10 mg, 10 mg, Oral, TID PRN  acetaminophen (TYLENOL) tablet 650 mg, 650 mg, Oral, Q4H PRN  ibuprofen (ADVIL;MOTRIN) tablet 400 mg, 400 mg, Oral, Q6H PRN  aluminum & magnesium hydroxide-simethicone (MAALOX) 200-200-20 MG/5ML suspension 30 mL, 30 mL, Oral, Q6H PRN  hydrOXYzine (ATARAX) tablet 50 mg, 50 mg, Oral, TID PRN  polyethylene glycol (GLYCOLAX) packet 17 g, 17 g, Oral, Daily PRN  traZODone (DESYREL) tablet 50 mg, 50 mg, Oral, Nightly PRN  nicotine polacrilex (NICORETTE) gum 2 mg, 2 mg, Oral, Q1H PRN    ASSESSMENT  Major depressive disorder, recurrent, severe with psychotic features [facility])         HANDOFF  Patient symptoms: Show no change  Methadone verified by pharmacist; last dose 4/9/2022 150 mg  Medications as determined by attending physician  Encourage participation in groups and milieu. Probable discharge is to be determined by MD    Electronically signed by JAZMINE Hall CNP on 4/11/2022 at 11:00 AM    **This report has been created using voice recognition software. It may contain minor errors which are inherent in voice recognition technology. **  I independently saw and evaluated the patient. I reviewed the nurse practioner's documentation above. Any additional comments or changes to the    documentation are stated below otherwise agree with assessment. The patient was seen at bedside. He was somnolent and reluctant to engage. He told me that he had been admitted because he was suicidal.  He has not taken his olanzapine last night. He does not believe he needs medication. He said that he is here because of a \"legal situation\". He was unwilling to diverge the details. The patient denies auditory visual hallucinations. He denies psychotic phenomena. He is alonso for safety on the unit. I encouraged him to take his medications. PLAN  Medications as noted above  Attempt to develop insight  Psycho-education conducted. Supportive Therapy conducted.   Probable discharge is 1-2 days  Follow-up daily while on inpatient unit    Electronically signed by Memo Chase MD on 4/11/22 at 7:52 PM EDT

## 2022-04-11 NOTE — PLAN OF CARE
Problem: Altered Mood, Depressive Behavior:  Goal: Able to verbalize acceptance of life and situations over which he or she has no control  Description: Able to verbalize acceptance of life and situations over which he or she has no control  4/11/2022 1118 by Deidre Doyle RN  Outcome: Ongoing  Note: Patient is evasive and guarded during attempted 1:1. Talk time. Patient is isolative to room and self, out for needs only. 4/10/2022 2134 by Luis Willis RN  Outcome: Ongoing     Problem: Tobacco Use:  Goal: Inpatient tobacco use cessation counseling participation  Description: Inpatient tobacco use cessation counseling participation  4/11/2022 1118 by Deidre Doyle RN  Outcome: Ongoing  Note: Patient given tobacco quitline number 1-396-518-8409 at this time. With nurse observation patient called number for information and follow up. Continue to reinforce the dangers of long term tobacco use and why tobacco cessation is important to patient. 4/10/2022 2134 by Luis Willis RN  Outcome: Ongoing     Problem: Substance Abuse:  Goal: Absence of drug withdrawal signs and symptoms  Description: Absence of drug withdrawal signs and symptoms  4/11/2022 1118 by Deidre Doyle RN  Outcome: Ongoing  Note: Patient is complaining of anxiety, muscle spasms, and abdominal cramping. Patient is receiving as needed medications to relieve these side effects of withdrawal.  4/10/2022 2134 by Luis Willis RN  Outcome: Ongoing     Problem: Altered Mood, Depressive Behavior:  Intervention: Assess psychological signs and symptoms of depression  Note: Patient is flat, withdrawn, guarded, isolative, and out for needs only. Intervention: Manage a safe environment  Note: Patient is within view of the nurses station. Problem: Tobacco Use:  Intervention: Tobacco-use cessation counseling  Note: Patient is receiving a smoking cessation medication per orders.      Problem: Substance Abuse:  Intervention: Manage a safe environment  Note: Patient is within view of the nurses station. Intervention: CIWA Scale Assessment  Note: As charted in Epic. Intervention: Assess nutritional intake  Note: Patient is eating 50% of meals but is refusing to fill out his menu.

## 2022-04-11 NOTE — PLAN OF CARE
Insight,Unmotivated  Present Suicidal Ideation: No  Present Homicidal Ideation: No    Daily Assessment Last Entry:   Daily Sleep (WDL): Within Defined Limits         Patient Currently in Pain: Denies  Daily Nutrition (WDL): Within Defined Limits    Patient Monitoring:  Frequency of Checks: 4 times per hour, close    Psychiatric Symptoms:   Depression Symptoms  Depression Symptoms: Change in energy level,Feelings of hopelessess  Anxiety Symptoms  Anxiety Symptoms: Generalized  Dorie Symptoms  Dorie Symptoms: No problems reported or observed. Psychosis Symptoms  Delusion Type: No problems reported or observed. Suicide Risk CSSR-S:  1) Within the past month, have you wished you were dead or wished you could go to sleep and not wake up? : Yes  2) Have you actually had any thoughts of killing yourself? : Yes  3) Have you been thinking about how you might kill yourself? : Yes  5) Have you started to work out or worked out the details of how to kill yourself? Do you intend to carry out this plan? : Yes  6) Have you ever done anything, started to do anything, or prepared to do anything to end your life?: Yes  Change in Result                   no                Change in Plan of care               no      EDUCATION:   EDUCATION:   Learner Progress Toward Treatment Goals: Reviewed results and recommendations of this team, Reviewed group plan and strategies, Reviewed signs, symptoms and risk of self harm and violent behavior, Reviewed goals and plan of care    Method:small group, individual verbal education    Outcome:verbalized by patient, but needs reinforcement to obtain goals    PATIENT GOALS:  Short term:Pt declined to meet with team,pt c/o not feeling well. Pt did not develop a short term goal.  Long term:Pt did not develop a long term goal.    PLAN/TREATMENT RECOMMENDATIONS UPDATE: continue with group therapies, increased socialization, continue planning for after discharge goals, continue with medication compliance    SHORT-TERM GOALS UPDATE:   Time frame for Short-Term Goals: 5-7 days    LONG-TERM GOALS UPDATE:   Time frame for Long-Term Goals: 6 months  Members Present in Team Meeting: See Signature Sheet    Tennille Leary

## 2022-04-11 NOTE — GROUP NOTE
Group Therapy Note    Date: 4/11/2022    Group Start Time: 1000  Group End Time: 1030  Group Topic: Psychotherapy    [facility] JERO Talamantes        Group Therapy Note           Patient refused to attend psychotherapy group after encouragement from staff. 1:1 talk time offered but refused. Signature:   Stuart Talamantes

## 2022-04-11 NOTE — GROUP NOTE
Group Therapy Note    Date: 4/11/2022    Group Start Time: 1430  Group End Time: 1525  Group Topic: Cognitive Skills    MANAN Sarabia, CTRS        Group Therapy Note    Attendees: 7/14       Pt did not participate in Cognitive Skills Group at 1430 when encouraged by RT due to pt resting in room. Pt was offered talk time as an alternative to group but declined.          Discipline Responsible: Psychoeducational Specialist        Signature:  Terrence Pierre

## 2022-04-11 NOTE — H&P
[address] [facility] Internal Medicine  Jono Oneill MD; Alexandra Cordova MD; Harpre May MD; MD Edita Belcher MD; MD PAULY [facility] Internal Medicine   [facility]    HISTORY AND PHYSICAL EXAMINATION            Date:   4/11/2022  Patient name:  Barbie Teresa  Date of admission:  4/9/2022  1:39 PM  MRN:   [id number]  Account:  [de-identified]  YOB: 1990  PCP:    No primary care provider on file. Room:   [address] [place] [address]  Code Status:    Full Code    Chief Complaint:     Chief Complaint   Patient presents with    Suicidal     plan on overdose meth/heroin or hang self   Street drug abuse intravenous drugs smoker    History Obtained From:     Patient medical record nursing staff    History of Present Illness:     Barbie Teresa is a 28 y.o. Unavailable / unknown male who presents with Suicidal (plan on overdose meth/heroin or hang self)   and is admitted to the hospital for the management of Major depressive disorder, recurrent, severe with psychotic features ([facility] [address] 29-year-old gentleman who is a smoker half pack a day history of intravenous drug abuse for many years has been in and out of the longterm for last 10 years with active drug use multiple needle track marks noted on the arms patient claims to be on methadone 150 mg daily denies any fever chills no history of endocarditis no history of osteomyelitis    Past Medical History:     Past Medical History:   Diagnosis Date    Bipolar disorder ([facility] [address]     Depression     Substance abuse ([facility] [address]         Past Surgical History:     History reviewed. No pertinent surgical history. Medications Prior to Admission:     Prior to Admission medications    Medication Sig Start Date End Date Taking? Authorizing Provider   methadone 10 MG/5ML solution Take 150 mg by mouth every 24 hours.    Yes Historical Provider, MD   OLANZapine (ZYPREXA) 15 MG tablet Take 1 tablet by mouth nightly 21   Nikki Canales MD   cloNIDine (CATAPRES) 0.1 MG tablet Take 1 tablet by mouth 3 times daily as needed for Other (Withdrawal)  Patient not taking: Reported on 21   Nikki Canales MD        Allergies:     Patient has no known allergies. Social History:     Tobacco:    reports that he has been smoking. His smokeless tobacco use includes chew. Alcohol:      reports current alcohol use. Drug Use:  reports current drug use. Drugs: IV and Marijuana ([facility]). Family History:     Family History   Problem Relation Age of Onset    Depression Other         uncle, commited suicide.  Alcohol Abuse Other         uncle    Alcohol Abuse Father        Review of Systems:     Positive and Negative as described in HPI.     CONSTITUTIONAL:  negative for fevers, chills, sweats, fatigue, weight loss  HEENT:  negative for vision, hearing changes, runny nose, throat pain  RESPIRATORY:  negative for shortness of breath, cough, congestion, wheezing  CARDIOVASCULAR:  negative for chest pain, palpitations  GASTROINTESTINAL:  negative for nausea, vomiting, diarrhea, constipation, change in bowel habits, abdominal pain   GENITOURINARY:  negative for difficulty of urination, burning with urination, frequency   INTEGUMENT:  negative for rash, skin lesions, easy bruising   HEMATOLOGIC/LYMPHATIC:  negative for swelling/edema   ALLERGIC/IMMUNOLOGIC:  negative for urticaria , itching  ENDOCRINE:  negative increase in drinking, increase in urination, hot or cold intolerance  MUSCULOSKELETAL:  negative joint pains, muscle aches, swelling of joints  NEUROLOGICAL:  negative for headaches, dizziness, lightheadedness, numbness, pain, tingling extremities      Physical Exam:   BP (!) 140/90   Pulse 66   Temp 97.3 °F (36.3 °C)   Resp 14   Ht 6' (1.829 m)   Wt 200 lb (90.7 kg)   SpO2 100%   BMI 27.12 kg/m²   Temp (24hrs), Av.5 °F (36.4 °C), Min:97.3 °F (36.3 °C), Max:97.6 °F (36.4 °C)    No results for input(s): POCGLU in the last 72 hours. No intake or output data in the 24 hours ending 04/11/22 1500    General Appearance: alert, well appearing, and in no acute distress  Mental status: oriented to person, place, and time  Head: normocephalic, atraumatic  Eye: no icterus, redness, pupils equal and reactive, extraocular eye movements intact, conjunctiva clear  Ear: normal external ear, no discharge, hearing intact  Nose: no drainage noted  Mouth: mucous membranes moist  Neck: supple, no carotid bruits, thyroid not palpable  Lungs: Bilateral equal air entry, clear to ausculation, no wheezing, rales or rhonchi, normal effort  Cardiovascular: normal rate, regular rhythm, no murmur, gallop, rub  Abdomen: Soft, nontender, nondistended, normal bowel sounds, no hepatomegaly or splenomegaly  Neurologic: There are no new focal motor or sensory deficits, normal muscle tone and bulk, no abnormal sensation, normal speech, cranial nerves II through XII grossly intact  Skin: No gross lesions, rashes, bruising or bleeding on exposed skin area  Extremities: peripheral pulses palpable, no pedal edema or calf pain with palpation  Multiple needle track marks noted on the arms from intravenous drug abuse    Investigations:      Laboratory Testing:  No results found for this or any previous visit (from the past 24 hour(s)). Imaging/Diagnostics:  No results found.     Assessment :      Hospital Problems           Last Modified POA    * (Principal) Major depressive disorder, recurrent, severe with psychotic features ([address] 4/10/2022 Yes    PTSD (post-traumatic stress disorder) 4/10/2022 Yes    Methamphetamine abuse ([address] 4/10/2022 Yes    Opioid abuse, daily use ([address] 4/10/2022 Yes          Plan:     26-year-old gentleman who is a smoker half pack a day history of intravenous drug abuse for many years has been in and out of the [facility] for last 10 years with active drug use multiple needle track marks noted on the arms patient claims to be on methadone 150 mg daily denies any fever chills no history of endocarditis no history of osteomyelitis  Will order CBC and CRP if elevated medially echocardiogram    4/11/22    · Labs ok vitals stable 1. Evelyn Osman MD  4/11/2022  3:00 PM    Copy sent to Dr. Zacarias primary care provider on file. Please note that this chart was generated using voice recognition Dragon dictation software. Although every effort was made to ensure the accuracy of this automated transcription, some errors in transcription may have occurred.

## 2022-04-11 NOTE — PROGRESS NOTES
Spoke with Leif at [facility] in [place], [place]. She reports the patient last dosed at their facility on 4/9/22 at 150 mg. He was given a take home dose of 150 mg for 4/10/22 but patient was admitted on 4/9/22.

## 2022-04-11 NOTE — GROUP NOTE
Group Therapy Note    Date: 4/11/2022    Group Start Time: 1100  Group End Time: 1150  Group Topic: Cognitive Skills    MANAN Metzger, CTRS        Group Therapy Note    Attendees: 7/18         Pt did not participate in Cognitive Skills Group at 1100am when encouraged by RT due to pt resting in room. Pt was offered talk time as an alternative to group but declined.          Discipline Responsible: Psychoeducational Specialist        Signature:  Lore Mandel

## 2022-04-11 NOTE — PLAN OF CARE
Problem: Altered Mood, Depressive Behavior:  Goal: Able to verbalize acceptance of life and situations over which he or she has no control  Description: Able to verbalize acceptance of life and situations over which he or she has no control  4/10/2022 2134 by Kali Mayorga RN  Outcome: Ongoing     Problem: Substance Abuse:  Goal: Absence of drug withdrawal signs and symptoms  Description: Absence of drug withdrawal signs and symptoms  Outcome: Ongoing     Patient was difficult to assess as he stated that he felt dopesick and was very withdrawn. He was offered medications to help with the withdrawal symptoms but he refused any at this time reporting that he just wanted to sleep. He refused his medications at this time. No self harm noted this shift. Safety precautions in place and Q 15 minute checks completed.

## 2022-04-12 VITALS
BODY MASS INDEX: 27.09 KG/M2 | OXYGEN SATURATION: 100 % | HEIGHT: 72 IN | WEIGHT: 200 LBS | HEART RATE: 74 BPM | DIASTOLIC BLOOD PRESSURE: 90 MMHG | TEMPERATURE: 98.2 F | RESPIRATION RATE: 14 BRPM | SYSTOLIC BLOOD PRESSURE: 152 MMHG

## 2022-04-12 LAB
ABSOLUTE EOS #: 0.2 K/UL (ref 0–0.4)
ABSOLUTE LYMPH #: 2.7 K/UL (ref 1–4.8)
ABSOLUTE MONO #: 0.6 K/UL (ref 0.1–1.3)
BASOPHILS # BLD: 1 % (ref 0–2)
BASOPHILS ABSOLUTE: 0.1 K/UL (ref 0–0.2)
EOSINOPHILS RELATIVE PERCENT: 3 % (ref 0–4)
HCT VFR BLD CALC: 43.6 % (ref 41–53)
HEMOGLOBIN: 15.1 G/DL (ref 13.5–17.5)
LYMPHOCYTES # BLD: 35 % (ref 24–44)
MCH RBC QN AUTO: 29.1 PG (ref 26–34)
MCHC RBC AUTO-ENTMCNC: 34.7 G/DL (ref 31–37)
MCV RBC AUTO: 84 FL (ref 80–100)
MONOCYTES # BLD: 7 % (ref 1–7)
PDW BLD-RTO: 13.6 % (ref 11.5–14.9)
PLATELET # BLD: 318 K/UL (ref 150–450)
PMV BLD AUTO: 7.1 FL (ref 6–12)
RBC # BLD: 5.19 M/UL (ref 4.5–5.9)
SEG NEUTROPHILS: 54 % (ref 36–66)
SEGMENTED NEUTROPHILS ABSOLUTE COUNT: 4.2 K/UL (ref 1.3–9.1)
WBC # BLD: 7.8 K/UL (ref 3.5–11)

## 2022-04-12 PROCEDURE — 6370000000 HC RX 637 (ALT 250 FOR IP): Performed by: PSYCHIATRY & NEUROLOGY

## 2022-04-12 PROCEDURE — 1240000000 HC EMOTIONAL WELLNESS R&B

## 2022-04-12 PROCEDURE — 36415 COLL VENOUS BLD VENIPUNCTURE: CPT

## 2022-04-12 PROCEDURE — 99232 SBSQ HOSP IP/OBS MODERATE 35: CPT | Performed by: PSYCHIATRY & NEUROLOGY

## 2022-04-12 PROCEDURE — APPSS30 APP SPLIT SHARED TIME 16-30 MINUTES

## 2022-04-12 PROCEDURE — 99232 SBSQ HOSP IP/OBS MODERATE 35: CPT | Performed by: INTERNAL MEDICINE

## 2022-04-12 PROCEDURE — 85025 COMPLETE CBC W/AUTO DIFF WBC: CPT

## 2022-04-12 RX ADMIN — NICOTINE POLACRILEX 2 MG: 2 GUM, CHEWING BUCCAL at 12:08

## 2022-04-12 RX ADMIN — OLANZAPINE 5 MG: 5 TABLET, FILM COATED ORAL at 20:48

## 2022-04-12 RX ADMIN — METHADONE HYDROCHLORIDE 150 MG: 10 SOLUTION ORAL at 10:35

## 2022-04-12 ASSESSMENT — PAIN SCALES - GENERAL: PAINLEVEL_OUTOF10: 3

## 2022-04-12 NOTE — PLAN OF CARE
Problem: Altered Mood, Depressive Behavior:  Goal: Able to verbalize acceptance of life and situations over which he or she has no control  Description: Able to verbalize acceptance of life and situations over which he or she has no control  Outcome: Ongoing  Note: Patient is attempting to accept his life challenges and cope with the stress of those challenges. Problem: Substance Abuse:  Goal: Absence of drug withdrawal signs and symptoms  Description: Absence of drug withdrawal signs and symptoms  Outcome: Ongoing  Note: Patient denies any suicidal/homicidal ideas or hallucinations but reports some depression and anxiety that is improving with stabilization of his medication. Patient is less isolative to room and socially selective. Patient denies withdraw symptoms and exhibits no signs. Programming encouraged. Problem: Tobacco Use:  Goal: Inpatient tobacco use cessation counseling participation  Description: Inpatient tobacco use cessation counseling participation  Outcome: Ongoing  Note: Ongoing.

## 2022-04-12 NOTE — PROGRESS NOTES
Daily Progress Note  4/12/2022    Patient Name: David Mcguire: Suicidal ideation with plan to overdose and hang self         SUBJECTIVE:      Patient is seen today for a follow up assessment. Patient is compliant with scheduled medications including methadone and Zyprexa. Patient is not required emergency medications in the past 24 hours. Patient is agreeable to interview in his room today. He reports improvement in withdrawal symptoms today and has been taking his methadone as prescribed. Patient appears to be very minimizing of his symptoms today and denies depression and anxiety. He appears to lack significant insight into his mental illness and is a severity of his suicidal thoughts to hang himself. Due to this he will be very unsafe out of the hospital at this time. He reports that he slept well and feels well rested today. Nyla Johnson reports improvement in suicidal ideation. He denies homicidal ideation. He denies auditory or visual lesions. He denies paranoia. Again, patient appears to be very minimizing of his symptoms and appears to lack significant insight. This writer asked patient what he was planning to do once he was discharged from the hospital and he states \"I'm going back to my parents house. \"  This writer asked if his parents were on board with this and he states, \"yes. \" However after looking through patient's chart, this note was found from nursing staff: \"Per patient's mom patient is not allowed to return to her home post-discharge. \"  Patient continues to be ambivalent about substance use treatment at this time. He is dishelved and does not appear to be engaging in hygiene needs. He is isolative to his room and does not attend any groups on the unit.      Appetite:  [x] Normal/Adequate/Unchanged  [] Increased  [] Decreased      Sleep:       [x] Normal/Adequate/Unchanged  [] Fair  [] Poor      Group Attendance on Unit:   [] Yes  [] Selectively    [x] No    Medication Side Effects:  Patient denies any medication side effects at the time of assessment. Mental Status Exam  Level of consciousness: Alert and awake. Appearance: Appropriate attire for setting, resting in bed, with poor grooming and hygiene, disheveled  Behavior/Motor: Approachable, slight psychomotor agitation, withdrawn  Attitude toward examiner: Cooperative, attentive, poor eye contact. Speech: Normal rate, normal volume, normal tone. Mood:  Patient reports \"a lot better\". Affect: Euthymic  Thought processes: Linear and coherent. Thought content: Denies homicidal ideation. Suicidal Ideation: Reports improvement in suicidal ideations  Delusions: No evidence of delusions. Denies paranoia. Perceptual Disturbance: Patient does not appear to be responding to internal stimuli. Denies auditory hallucinations. Denies visual hallucinations. Cognition: Oriented to self, location, time, and situation. Memory: Intact. Insight & Judgement: Poor. Data   height is 6' (1.829 m) and weight is 200 lb (90.7 kg). His oral temperature is 97.5 °F (36.4 °C). His blood pressure is 135/76 and his pulse is 66. His respiration is 14 and oxygen saturation is 100%.    Labs:   Admission on 04/09/2022   Component Date Value Ref Range Status    WBC 04/09/2022 5.8  3.5 - 11.0 k/uL Final    RBC 04/09/2022 4.72  4.5 - 5.9 m/uL Final    Hemoglobin 04/09/2022 13.8  13.5 - 17.5 g/dL Final    Hematocrit 04/09/2022 39.2* 41 - 53 % Final    MCV 04/09/2022 83.2  80 - 100 fL Final    MCH 04/09/2022 29.3  26 - 34 pg Final    MCHC 04/09/2022 35.2  31 - 37 g/dL Final    RDW 04/09/2022 13.3  11.5 - 14.9 % Final    Platelets 76/98/6497 278  150 - 450 k/uL Final    MPV 04/09/2022 7.4  6.0 - 12.0 fL Final    Seg Neutrophils 04/09/2022 48  36 - 66 % Final    Lymphocytes 04/09/2022 41  24 - 44 % Final    Monocytes 04/09/2022 7  1 - 7 % Final    Eosinophils % 04/09/2022 3  0 - 4 % Final    Basophils 04/09/2022 1 0 - 2 % Final    Segs Absolute 04/09/2022 2.80  1.3 - 9.1 k/uL Final    Absolute Lymph # 04/09/2022 2.40  1.0 - 4.8 k/uL Final    Absolute Mono # 04/09/2022 0.40  0.1 - 1.3 k/uL Final    Absolute Eos # 04/09/2022 0.20  0.0 - 0.4 k/uL Final    Basophils Absolute 04/09/2022 0.00  0.0 - 0.2 k/uL Final    Glucose 04/09/2022 106* 70 - 99 mg/dL Final    BUN 04/09/2022 7  6 - 20 mg/dL Final    CREATININE 04/09/2022 0.81  0.70 - 1.20 mg/dL Final    Calcium 04/09/2022 9.3  8.6 - 10.4 mg/dL Final    Sodium 04/09/2022 139  135 - 144 mmol/L Final    Potassium 04/09/2022 4.1  3.7 - 5.3 mmol/L Final    Chloride 04/09/2022 103  98 - 107 mmol/L Final    CO2 04/09/2022 23  20 - 31 mmol/L Final    Anion Gap 04/09/2022 13  9 - 17 mmol/L Final    Alkaline Phosphatase 04/09/2022 80  40 - 129 U/L Final    ALT 04/09/2022 24  5 - 41 U/L Final    AST 04/09/2022 19  <40 U/L Final    Total Bilirubin 04/09/2022 0.23* 0.3 - 1.2 mg/dL Final    Total Protein 04/09/2022 7.4  6.4 - 8.3 g/dL Final    Albumin 04/09/2022 4.3  3.5 - 5.2 g/dL Final    GFR Non- 04/09/2022 >60  >60 mL/min Final    GFR  04/09/2022 >60  >60 mL/min Final    GFR Comment 04/09/2022        Final    Comment: Average GFR for 30-36 years old:   80 mL/min/1.73sq m  Chronic Kidney Disease:   <60 mL/min/1.73sq m  Kidney failure:   <15 mL/min/1.73sq m              eGFR calculated using average adult body mass.  Additional eGFR calculator available at:        [employer]            Color, UA 04/09/2022 Yellow  Yellow Final    Turbidity UA 04/09/2022 Clear  Clear Final    Glucose, Ur 04/09/2022 NEGATIVE  NEGATIVE Final    Bilirubin Urine 04/09/2022 NEGATIVE  NEGATIVE Final    Ketones, Urine 04/09/2022 NEGATIVE  NEGATIVE Final    Specific Gravity, UA 04/09/2022 1.008  1.000 - 1.030 Final    Urine Hgb 04/09/2022 NEGATIVE  NEGATIVE Final    pH, UA 04/09/2022 7.0  5.0 - 8.0 Final    Protein, confirmation   of test result, please call the lab within 7 days of sample submission.  Specimen Description 04/09/2022 . NASOPHARYNGEAL SWAB   Final    SARS-CoV-2, Rapid 04/09/2022 Not Detected  Not Detected Final    Comment:       Rapid NAAT:  The specimen is NEGATIVE for SARS-CoV-2, the novel coronavirus associated with   COVID-19. The ID NOW COVID-19 assay is designed to detect the virus that causes COVID-19 in patients   with signs and symptoms of infection who are suspected of COVID-19. An individual without symptoms of COVID-19 and who is not shedding SARS-CoV-2 virus would   expect to have a negative (not detected) result in this assay. Negative results should be treated as presumptive and, if inconsistent with clinical signs   and symptoms or necessary for patient management,  should be tested with an alternative molecular assay. Negative results do not preclude   SARS-CoV-2 infection and   should not be used as the sole basis for patient management decisions.          Fact sheet for Healthcare Providers: Jamie  Fact sheet for Patients: Nolan.mars          Methodology: Isothermal Nucleic Acid Amplification      WBC 04/12/2022 7.8  3.5 - 11.0 k/uL Final    RBC 04/12/2022 5.19  4.5 - 5.9 m/uL Final    Hemoglobin 04/12/2022 15.1  13.5 - 17.5 g/dL Final    Hematocrit 04/12/2022 43.6  41 - 53 % Final    MCV 04/12/2022 84.0  80 - 100 fL Final    MCH 04/12/2022 29.1  26 - 34 pg Final    MCHC 04/12/2022 34.7  31 - 37 g/dL Final    RDW 04/12/2022 13.6  11.5 - 14.9 % Final    Platelets 55/13/0510 318  150 - 450 k/uL Final    MPV 04/12/2022 7.1  6.0 - 12.0 fL Final    Seg Neutrophils 04/12/2022 54  36 - 66 % Final    Lymphocytes 04/12/2022 35  24 - 44 % Final    Monocytes 04/12/2022 7  1 - 7 % Final    Eosinophils % 04/12/2022 3  0 - 4 % Final    Basophils 04/12/2022 1  0 - 2 % Final    Segs Absolute 04/12/2022 4.20  1.3 - 9.1 k/uL Final    Absolute Lymph # 04/12/2022 2.70  1.0 - 4.8 k/uL Final    Absolute Mono # 04/12/2022 0.60  0.1 - 1.3 k/uL Final    Absolute Eos # 04/12/2022 0.20  0.0 - 0.4 k/uL Final    Basophils Absolute 04/12/2022 0.10  0.0 - 0.2 k/uL Final         Reviewed patient's current plan of care and vital signs with nursing staff. Labs reviewed: [x] Yes    Medications  Current Facility-Administered Medications: methadone 10 MG/5ML solution 150 mg, 150 mg, Oral, Daily  OLANZapine (ZYPREXA) tablet 5 mg, 5 mg, Oral, Nightly  ondansetron (ZOFRAN) tablet 4 mg, 4 mg, Oral, Q8H PRN  cloNIDine (CATAPRES) tablet 0.1 mg, 0.1 mg, Oral, Q4H PRN  dicyclomine (BENTYL) capsule 20 mg, 20 mg, Oral, 4x Daily PRN  cyclobenzaprine (FLEXERIL) tablet 10 mg, 10 mg, Oral, TID PRN  acetaminophen (TYLENOL) tablet 650 mg, 650 mg, Oral, Q4H PRN  ibuprofen (ADVIL;MOTRIN) tablet 400 mg, 400 mg, Oral, Q6H PRN  aluminum & magnesium hydroxide-simethicone (MAALOX) 200-200-20 MG/5ML suspension 30 mL, 30 mL, Oral, Q6H PRN  hydrOXYzine (ATARAX) tablet 50 mg, 50 mg, Oral, TID PRN  polyethylene glycol (GLYCOLAX) packet 17 g, 17 g, Oral, Daily PRN  traZODone (DESYREL) tablet 50 mg, 50 mg, Oral, Nightly PRN  nicotine polacrilex (NICORETTE) gum 2 mg, 2 mg, Oral, Q1H PRN    ASSESSMENT  Major depressive disorder, recurrent, severe with psychotic features ([facility], [address]         HANDOFF  Patient symptoms are: Remains Unstable. Medications as determined by attending physician     Monitor need and frequency of PRN medications. Encourage participation in groups and milieu. Probable discharge is to be determined by MD.     Electronically signed by JAZMINE Vick CNP on 4/12/2022 at 3:53 PM    **This report has been created using voice recognition software. It may contain minor errors which are inherent in voice recognition technology. **  I independently saw and evaluated the patient. I reviewed the  documentation above.   Any additional comments or changes to the midlevel provider's documentation are stated below otherwise agree with assessment. The patient reports an improvement in his mood. He has been going to groups and interacting appropriately with his peers. We will aim for discharge tomorrow. He is alonso for safety      PLAN  Medications as noted above  Attempt to develop insight  Psycho-education conducted. Supportive Therapy conducted.   Probable discharge is 1-2 days  Follow-up daily while on inpatient unit    Electronically signed by Wilfred Woodall MD on 4/12/22 at 7:38 PM EDT

## 2022-04-12 NOTE — GROUP NOTE
Group Therapy Note    Date: 4/12/2022    Group Start Time: 1000  Group End Time: 1030  Group Topic: Psychotherapy    [facility]    Sakina Payment        Group Therapy Note         Patient refused to attend psychotherapy group after encouragement from staff. 1:1 talk time offered but refused. Signature:   Sakina Charlton

## 2022-04-12 NOTE — GROUP NOTE
Group Therapy Note    Date: 4/12/2022    Group Start Time: 1500  Group End Time: 5404  Group Topic: Cognitive Skills    [facility]    VERO Sinha        Group Therapy Note    Attendees: 7/20         Patient's Goal:  To increase social interaction and to practice decision making,  and communication skills. Notes: Pt participated fully in group. Pt was able to practice decision making, and communication skills         Status After Intervention:  Improved     Participation Level:  Active Listener, sharing, appropriate     Participation Quality: Appropriate, Sharing, Attentive, supportive        Speech:  Normal        Thought Process/Content: Logical ,some thought blocking     Affective Functioning: Congruent     Mood: Euthymic     Level of consciousness:  Alert, and Attentive        Response to Learning:  Able to express current knowledge, able to verbalize/acknowledge new learning, and Progressing to goal        Endings: None Reported     Modes of Intervention: Education, Support, Socialization, Exploration, Clarifying and Problem-solving        Discipline Responsible: Psychoeducational Specialist        Signature:  VERO Nino

## 2022-04-12 NOTE — PLAN OF CARE
Problem: Altered Mood, Depressive Behavior:  Goal: Able to verbalize acceptance of life and situations over which he or she has no control  Description: Able to verbalize acceptance of life and situations over which he or she has no control  4/11/2022 2126 by Gurmeet Garcia LPN  Outcome: Ongoing     Problem: Substance Abuse:  Goal: Absence of drug withdrawal signs and symptoms  Description: Absence of drug withdrawal signs and symptoms  4/11/2022 2126 by Gurmeet Garcia LPN  Outcome: Ongoing  Note: Patient states on methadone everything is so much better and he does not need any more meds to help with w/d symptoms.

## 2022-04-13 PROCEDURE — 6370000000 HC RX 637 (ALT 250 FOR IP): Performed by: PSYCHIATRY & NEUROLOGY

## 2022-04-13 PROCEDURE — 99239 HOSP IP/OBS DSCHRG MGMT >30: CPT | Performed by: PSYCHIATRY & NEUROLOGY

## 2022-04-13 RX ORDER — OLANZAPINE 5 MG/1
5 TABLET ORAL NIGHTLY
Qty: 30 TABLET | Refills: 3 | Status: SHIPPED | OUTPATIENT
Start: 2022-04-13

## 2022-04-13 RX ADMIN — METHADONE HYDROCHLORIDE 150 MG: 10 SOLUTION ORAL at 10:28

## 2022-04-13 RX ADMIN — NICOTINE POLACRILEX 2 MG: 2 GUM, CHEWING BUCCAL at 12:16

## 2022-04-13 ASSESSMENT — PAIN SCALES - GENERAL: PAINLEVEL_OUTOF10: 0

## 2022-04-13 NOTE — CARE COORDINATION
Name: Adele Oliver    : 1990    Discharge Date: 2022    Primary Auth/Cert #: [id number]    Destination: home via     Discharge Medications:      Medication List      CHANGE how you take these medications    OLANZapine 5 MG tablet  Commonly known as: ZYPREXA  Take 1 tablet by mouth nightly  What changed:   · medication strength  · how much to take  Notes to patient: Clears thoughts        CONTINUE taking these medications    methadone 10 MG/5ML solution  Notes to patient: Substance abuse maintenance        STOP taking these medications    cloNIDine 0.1 MG tablet  Commonly known as: CATAPRES           Where to Get Your Medications      These medications were sent to [address] [facility], [address]  [address] [facility] [address]    Phone: [phone]   · OLANZapine 5 MG tablet         Follow Up Appointment: [address]  Phone: [phone]  Fax: [phone]    Go on 2022  [facility], your first appt. is  at 1:30 pm for case management. Friday, April 15 at 12:30 pm for assessment and on  t 12:35 pm with nurse practitioner    [address] [place] [address]  Phone: [phone]  Fax: [phone]  Please fax AVS  Go on 2022  [facility], you are still open for walk-in dosing Methadone treatment. Discharge to home:  [address] [facility] [address]  Go on 2022  Hammad's mother, Ramon Salinas at [phone] will pick Hammad up at time of discharge.

## 2022-04-13 NOTE — PLAN OF CARE
Problem: Altered Mood, Depressive Behavior:  Goal: Able to verbalize acceptance of life and situations over which he or she has no control  Description: Able to verbalize acceptance of life and situations over which he or she has no control  4/12/2022 2236 by Gurmeet Garcia LPN  Outcome: Ongoing     Problem: Substance Abuse:  Goal: Absence of drug withdrawal signs and symptoms  Description: Absence of drug withdrawal signs and symptoms  4/12/2022 2236 by Gurmeet Garcia LPN  Outcome: Ongoing  Note: Patient states having no more signs of withdraw at this time.

## 2022-04-13 NOTE — PLAN OF CARE
Problem: Altered Mood, Depressive Behavior:  Goal: Able to verbalize acceptance of life and situations over which he or she has no control  Description: Able to verbalize acceptance of life and situations over which he or she has no control  4/13/2022 1118 by Erika Dowell  Outcome: Ongoing  Pt. Denies suicidal thoughts. Denies hallucinations. Medication compliant. Pt. Controlled and cooperative. Pt. Remains safe on the unit. Q 15 minute checks for safety maintained. Problem: Substance Abuse:  Goal: Absence of drug withdrawal signs and symptoms  Description: Absence of drug withdrawal signs and symptoms  4/13/2022 1118 by Erika Dowell  Outcome: Ongoing   Pt. Denies signs and symptoms of withdrawal. Pt. Says he is returning to his mother's home today.

## 2022-04-13 NOTE — GROUP NOTE
Group Therapy Note    Date: 4/13/2022    Group Start Time: 1100  Group End Time: 0905  Group Topic: Cognitive Skills    [facility] V[facility] Paulino        Group Therapy Note    Attendees: 8/20         Patient's Goal:  To increase social interaction and to practice problem solving,  and communication skills. Notes: Pt participated fully in group. Pt was able to practice problem solving, and communication skills         Status After Intervention:  Improved     Participation Level:  Active Listener, sharing, appropriate     Participation Quality: Appropriate, Sharing, Attentive, supportive        Speech:  Normal        Thought Process/Content: Logical ,linear     Affective Functioning: Congruent     Mood: Euthymic     Level of consciousness:  Alert, and Attentive        Response to Learning:  Able to express current knowledge, able to verbalize/acknowledge new learning, and Progressing to goal        Endings: None Reported     Modes of Intervention: Education, Support, Socialization, Exploration, Clarifying and Problem-solving        Discipline Responsible: Psychoeducational Specialist        Signature:  VERO Stark
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
## 2022-04-13 NOTE — GROUP NOTE
Group Therapy Note    Date: 4/13/2022    Group Start Time: 1000  Group End Time: 1030  Group Topic: Psychotherapy    MANAN Krishna        Group Therapy Note         Patient refused to attend psychotherapy group after encouragement from staff. 1:1 talk time offered but refused. Signature:   Lisa Krishna

## 2022-04-13 NOTE — BH NOTE
Patient given tobacco quitline number 79980477296 at this time, refusing to call at this time, states \" I just dont want to quit now\"- patient given information as to the dangers of long term tobacco use. Continue to reinforce the importance of tobacco cessation.

## 2022-04-13 NOTE — BH NOTE
[facility]  Discharge Note     Pt belongings: Retrieved from room/safe, reviewed and packed to take with. Dental Appliances: None  Vision - Corrective Lenses: Glasses  Hearing Aid: None  Jewelry: None  Body Piercings Removed: N/A  Clothing: José Miguel José Miguel / coat,Pants,Shirt,Socks  Were All Patient Medications Collected?: Not Applicable  Other Valuables: Cell phone,Wallet       Patient discharged to private residence, picked up by . Instructed on discharge instructions, pt verbalizes understanding and signs AVS. Pt in control at time of discharge. Pt ambulates to main entrance with psych staff. [facility] No complaints voiced at this time.         Status EXAM upon discharge:  Status and Exam  Normal: Yes  Facial Expression: Flat  Affect: Blunt  Level of Consciousness: Alert  Mood:Normal: Yes  Mood: Anxious  Motor Activity:Normal: Yes  Motor Activity: Decreased  Interview Behavior: Cooperative  Preception: [place] to Person,[place] to Time,[place] to Place,[place] to Situation  Attention:Normal: Yes  Attention: Distractible  Thought Processes: Circumstantial  Thought Content:Normal: Yes  Thought Content: Preoccupations  Hallucinations: None  Delusions: No  Memory:Normal: Yes  Memory: Poor Remote  Insight and Judgment: Yes  Insight and Judgment: Poor Judgment  Present Suicidal Ideation: No  Present Homicidal Ideation: No    Nicky Bryant LPN

## 2022-04-13 NOTE — CARE COORDINATION
DISCHARGE UPDATE:  - Writer speaks with PT regarding not being able to return to live at mother's home and if he had an alternative plan. He states \"that is my home and she can't evict me. \"  PT provides friend's address at  [address] [place] [address]., [facility], [phone], writer leaves a voicemail regarding PT going to live with friend.

## 2023-07-17 ENCOUNTER — HOSPITAL ENCOUNTER (INPATIENT)
Age: 33
LOS: 4 days | Discharge: HOME | End: 2023-07-21
Attending: EMERGENCY MEDICINE | Admitting: PSYCHIATRY & NEUROLOGY
Payer: COMMERCIAL

## 2023-07-17 DIAGNOSIS — R45.851 DEPRESSION WITH SUICIDAL IDEATION: Primary | ICD-10-CM

## 2023-07-17 DIAGNOSIS — F32.A DEPRESSION WITH SUICIDAL IDEATION: Primary | ICD-10-CM

## 2023-07-17 LAB
ALBUMIN SERPL-MCNC: 4 G/DL (ref 3.5–5.2)
ALP SERPL-CCNC: 83 U/L (ref 40–129)
ALT SERPL-CCNC: 17 U/L (ref 5–41)
AMPHET UR QL SCN: NEGATIVE
ANION GAP SERPL CALCULATED.3IONS-SCNC: 12 MMOL/L (ref 9–17)
APAP SERPL-MCNC: <5 UG/ML (ref 10–30)
AST SERPL-CCNC: 16 U/L
BARBITURATES UR QL SCN: NEGATIVE
BASOPHILS # BLD: 0.1 K/UL (ref 0–0.2)
BASOPHILS NFR BLD: 1 % (ref 0–2)
BENZODIAZ UR QL: NEGATIVE
BILIRUB SERPL-MCNC: 0.3 MG/DL (ref 0.3–1.2)
BUN SERPL-MCNC: 9 MG/DL (ref 6–20)
CALCIUM SERPL-MCNC: 9.6 MG/DL (ref 8.6–10.4)
CANNABINOIDS UR QL SCN: POSITIVE
CHLORIDE SERPL-SCNC: 104 MMOL/L (ref 98–107)
CO2 SERPL-SCNC: 22 MMOL/L (ref 20–31)
COCAINE UR QL SCN: NEGATIVE
CREAT SERPL-MCNC: 0.7 MG/DL (ref 0.7–1.2)
EOSINOPHIL # BLD: 0.1 K/UL (ref 0–0.4)
EOSINOPHILS RELATIVE PERCENT: 2 % (ref 0–4)
ERYTHROCYTE [DISTWIDTH] IN BLOOD BY AUTOMATED COUNT: 12.9 % (ref 11.5–14.9)
ETHANOL PERCENT: <0.01 %
ETHANOLAMINE SERPL-MCNC: <10 MG/DL
FENTANYL UR QL: POSITIVE
GFR SERPL CREATININE-BSD FRML MDRD: >60 ML/MIN/1.73M2
GLUCOSE SERPL-MCNC: 105 MG/DL (ref 70–99)
HCT VFR BLD AUTO: 41.5 % (ref 41–53)
HGB BLD-MCNC: 13.8 G/DL (ref 13.5–17.5)
LYMPHOCYTES # BLD: 30 % (ref 24–44)
LYMPHOCYTES NFR BLD: 2 K/UL (ref 1–4.8)
MAGNESIUM SERPL-MCNC: 2 MG/DL (ref 1.6–2.6)
MCH RBC QN AUTO: 27.3 PG (ref 26–34)
MCHC RBC AUTO-ENTMCNC: 33.3 G/DL (ref 31–37)
MCV RBC AUTO: 82.1 FL (ref 80–100)
METHADONE UR QL: NEGATIVE
MONOCYTES NFR BLD: 0.6 K/UL (ref 0.1–1.3)
MONOCYTES NFR BLD: 9 % (ref 1–7)
NEUTROPHILS NFR BLD: 58 % (ref 36–66)
NEUTS SEG NFR BLD: 3.9 K/UL (ref 1.3–9.1)
OPIATES UR QL SCN: NEGATIVE
OXYCODONE UR QL SCN: NEGATIVE
PCP UR QL SCN: NEGATIVE
PLATELET # BLD AUTO: 301 K/UL (ref 150–450)
PMV BLD AUTO: 7.7 FL (ref 6–12)
POTASSIUM SERPL-SCNC: 4.5 MMOL/L (ref 3.7–5.3)
PROT SERPL-MCNC: 7.6 G/DL (ref 6.4–8.3)
RBC # BLD AUTO: 5.06 M/UL (ref 4.5–5.9)
SALICYLATES SERPL-MCNC: <1 MG/DL (ref 3–10)
SODIUM SERPL-SCNC: 138 MMOL/L (ref 135–144)
TEST INFORMATION: ABNORMAL
WBC OTHER # BLD: 6.5 K/UL (ref 3.5–11)

## 2023-07-17 PROCEDURE — G0480 DRUG TEST DEF 1-7 CLASSES: HCPCS

## 2023-07-17 PROCEDURE — 99285 EMERGENCY DEPT VISIT HI MDM: CPT

## 2023-07-17 PROCEDURE — 85027 COMPLETE CBC AUTOMATED: CPT

## 2023-07-17 PROCEDURE — 6370000000 HC RX 637 (ALT 250 FOR IP): Performed by: PSYCHIATRY & NEUROLOGY

## 2023-07-17 PROCEDURE — 83735 ASSAY OF MAGNESIUM: CPT

## 2023-07-17 PROCEDURE — 1240000000 HC EMOTIONAL WELLNESS R&B

## 2023-07-17 PROCEDURE — 80053 COMPREHEN METABOLIC PANEL: CPT

## 2023-07-17 PROCEDURE — 80179 DRUG ASSAY SALICYLATE: CPT

## 2023-07-17 PROCEDURE — 80307 DRUG TEST PRSMV CHEM ANLYZR: CPT

## 2023-07-17 PROCEDURE — 80143 DRUG ASSAY ACETAMINOPHEN: CPT

## 2023-07-17 PROCEDURE — 36415 COLL VENOUS BLD VENIPUNCTURE: CPT

## 2023-07-17 RX ORDER — POLYETHYLENE GLYCOL 3350 17 G/17G
17 POWDER, FOR SOLUTION ORAL DAILY PRN
Status: DISCONTINUED | OUTPATIENT
Start: 2023-07-17 | End: 2023-07-21 | Stop reason: HOSPADM

## 2023-07-17 RX ORDER — TRAZODONE HYDROCHLORIDE 50 MG/1
50 TABLET ORAL NIGHTLY PRN
Status: DISCONTINUED | OUTPATIENT
Start: 2023-07-18 | End: 2023-07-21 | Stop reason: HOSPADM

## 2023-07-17 RX ORDER — ACETAMINOPHEN 325 MG/1
650 TABLET ORAL EVERY 6 HOURS PRN
Status: DISCONTINUED | OUTPATIENT
Start: 2023-07-17 | End: 2023-07-21 | Stop reason: HOSPADM

## 2023-07-17 RX ORDER — IBUPROFEN 400 MG/1
400 TABLET ORAL EVERY 6 HOURS PRN
Status: DISCONTINUED | OUTPATIENT
Start: 2023-07-17 | End: 2023-07-21 | Stop reason: HOSPADM

## 2023-07-17 RX ORDER — MAGNESIUM HYDROXIDE/ALUMINUM HYDROXICE/SIMETHICONE 120; 1200; 1200 MG/30ML; MG/30ML; MG/30ML
30 SUSPENSION ORAL EVERY 6 HOURS PRN
Status: DISCONTINUED | OUTPATIENT
Start: 2023-07-17 | End: 2023-07-21 | Stop reason: HOSPADM

## 2023-07-17 RX ORDER — DIPHENHYDRAMINE HYDROCHLORIDE 50 MG/ML
50 INJECTION INTRAMUSCULAR; INTRAVENOUS EVERY 6 HOURS PRN
Status: DISCONTINUED | OUTPATIENT
Start: 2023-07-17 | End: 2023-07-21 | Stop reason: HOSPADM

## 2023-07-17 RX ORDER — HALOPERIDOL 5 MG/ML
5 INJECTION INTRAMUSCULAR EVERY 6 HOURS PRN
Status: DISCONTINUED | OUTPATIENT
Start: 2023-07-17 | End: 2023-07-21 | Stop reason: HOSPADM

## 2023-07-17 RX ORDER — LORAZEPAM 2 MG/ML
2 INJECTION INTRAMUSCULAR EVERY 6 HOURS PRN
Status: DISCONTINUED | OUTPATIENT
Start: 2023-07-17 | End: 2023-07-21 | Stop reason: HOSPADM

## 2023-07-17 RX ORDER — OLANZAPINE 5 MG/1
5 TABLET ORAL NIGHTLY
Status: DISCONTINUED | OUTPATIENT
Start: 2023-07-17 | End: 2023-07-21 | Stop reason: HOSPADM

## 2023-07-17 RX ORDER — HYDROXYZINE 50 MG/1
50 TABLET, FILM COATED ORAL 3 TIMES DAILY PRN
Status: DISCONTINUED | OUTPATIENT
Start: 2023-07-17 | End: 2023-07-21 | Stop reason: HOSPADM

## 2023-07-17 RX ORDER — HALOPERIDOL 5 MG/1
5 TABLET ORAL EVERY 6 HOURS PRN
Status: DISCONTINUED | OUTPATIENT
Start: 2023-07-17 | End: 2023-07-21 | Stop reason: HOSPADM

## 2023-07-17 RX ORDER — LORAZEPAM 1 MG/1
2 TABLET ORAL EVERY 6 HOURS PRN
Status: DISCONTINUED | OUTPATIENT
Start: 2023-07-17 | End: 2023-07-21 | Stop reason: HOSPADM

## 2023-07-17 RX ADMIN — OLANZAPINE 5 MG: 5 TABLET, FILM COATED ORAL at 21:57

## 2023-07-17 ASSESSMENT — PAIN - FUNCTIONAL ASSESSMENT: PAIN_FUNCTIONAL_ASSESSMENT: NONE - DENIES PAIN

## 2023-07-17 ASSESSMENT — ENCOUNTER SYMPTOMS
SHORTNESS OF BREATH: 0
COLOR CHANGE: 0
ABDOMINAL PAIN: 0
BACK PAIN: 0
EYE PAIN: 0

## 2023-07-17 ASSESSMENT — SLEEP AND FATIGUE QUESTIONNAIRES
DO YOU USE A SLEEP AID: NO
AVERAGE NUMBER OF SLEEP HOURS: 4
DO YOU HAVE DIFFICULTY SLEEPING: YES
SLEEP PATTERN: DIFFICULTY FALLING ASLEEP;RESTLESSNESS;DISTURBED/INTERRUPTED SLEEP;INSOMNIA

## 2023-07-17 ASSESSMENT — LIFESTYLE VARIABLES
HOW MANY STANDARD DRINKS CONTAINING ALCOHOL DO YOU HAVE ON A TYPICAL DAY: PATIENT DOES NOT DRINK
HOW OFTEN DO YOU HAVE A DRINK CONTAINING ALCOHOL: NEVER

## 2023-07-17 NOTE — ED TRIAGE NOTES
Mode of arrival (squad #, walk in, police, etc) : walk in        Chief complaint(s): suicidal        Arrival Note (brief scenario, treatment PTA, etc). : wants to overdose on pills      C= \"Have you ever felt that you should Cut down on your drinking? \"  No  A= \"Have people Annoyed you by criticizing your drinking? \"  No  G= \"Have you ever felt bad or Guilty about your drinking? \"  No  E= \"Have you ever had a drink as an Eye-opener first thing in the morning to steady your nerves or to help a hangover? \"  No      Deferred []      Reason for deferring: N/A    *If yes to two or more: probable alcohol abuse. *

## 2023-07-17 NOTE — ED PROVIDER NOTES
EMERGENCY DEPARTMENT ENCOUNTER    Pt Name: Eleanor Puckett  MRN: [id number]  [address] [place] 1990  Date of evaluation: 7/17/23  CHIEF COMPLAINT       Chief Complaint   Patient presents with    Suicidal     HISTORY OF PRESENT ILLNESS   36-year-old male presents for mental health evaluation. Patient reports that he lost his medication and since losing his meds and not been taking it has been feeling more suicidal.  Reports the suicidal thoughts related to multiple different things including his legal issues as well as drug use and relationship issues. He states he has been having suicidal thoughts with a plan to try and overdose. Reports history of prior suicide attempts. He denies any homicidal ideation, he reports he has been hearing voices telling him to do \"bad things also reports he has been seeing things but could not elaborate. He admits to recent drug use she reports has been using anything he can get his hands on including meth heroin and cocaine. Denies any medical complaints at this time. The history is provided by the patient. REVIEW OF SYSTEMS     Review of Systems   Constitutional:  Negative for chills and fever. HENT:  Negative for congestion and ear pain. Eyes:  Negative for pain. Respiratory:  Negative for shortness of breath. Cardiovascular:  Negative for chest pain, palpitations and leg swelling. Gastrointestinal:  Negative for abdominal pain. Genitourinary:  Negative for dysuria and flank pain. Musculoskeletal:  Negative for back pain. Skin:  Negative for color change. Neurological:  Negative for numbness and headaches. Psychiatric/Behavioral:  Positive for hallucinations and suicidal ideas. Negative for confusion. All other systems reviewed and are negative.   PASTMEDICAL HISTORY     Past Medical History:   Diagnosis Date    Bipolar disorder ([facility])     Depression     Substance abuse [facility])      Past Problem List  Patient Active Problem List   Diagnosis Code LORazepam (ATIVAN) injection 2 mg     diphenhydrAMINE (BENADRYL) injection 50 mg    acetaminophen (TYLENOL) tablet 650 mg    ibuprofen (ADVIL;MOTRIN) tablet 400 mg    hydrOXYzine HCl (ATARAX) tablet 50 mg    traZODone (DESYREL) tablet 50 mg    polyethylene glycol (GLYCOLAX) packet 17 g    aluminum & magnesium hydroxide-simethicone (MAALOX) 200-200-20 MG/5ML suspension 30 mL    AND Linked Order Group     haloperidol (HALDOL) tablet 5 mg     LORazepam (ATIVAN) tablet 2 mg     DISCHARGE PRESCRIPTIONS:  Current Discharge Medication List        PHYSICIAN CONSULTS ORDERED THIS ENCOUNTER:  IP CONSULT TO INTERNAL MEDICINE  FINAL IMPRESSION      1. Depression with suicidal ideation          DISPOSITION/PLAN   DISPOSITION Decision To Admit 07/17/2023 07:54:53 PM      OUTPATIENT FOLLOW UP THE PATIENT:  No follow-up provider specified.     DO Derek Vance DO  07/18/23 0040

## 2023-07-18 PROCEDURE — 6370000000 HC RX 637 (ALT 250 FOR IP): Performed by: PSYCHIATRY & NEUROLOGY

## 2023-07-18 PROCEDURE — APPSS60 APP SPLIT SHARED TIME 46-60 MINUTES: Performed by: PSYCHIATRY & NEUROLOGY

## 2023-07-18 PROCEDURE — 1240000000 HC EMOTIONAL WELLNESS R&B

## 2023-07-18 PROCEDURE — 99222 1ST HOSP IP/OBS MODERATE 55: CPT | Performed by: INTERNAL MEDICINE

## 2023-07-18 RX ORDER — DICYCLOMINE HCL 20 MG
20 TABLET ORAL EVERY 6 HOURS PRN
Status: DISCONTINUED | OUTPATIENT
Start: 2023-07-18 | End: 2023-07-21 | Stop reason: HOSPADM

## 2023-07-18 RX ORDER — LOPERAMIDE HYDROCHLORIDE 2 MG/1
2 CAPSULE ORAL 4 TIMES DAILY PRN
Status: DISCONTINUED | OUTPATIENT
Start: 2023-07-18 | End: 2023-07-21 | Stop reason: HOSPADM

## 2023-07-18 RX ORDER — BUPRENORPHINE AND NALOXONE 4; 1 MG/1; MG/1
1 FILM, SOLUBLE BUCCAL; SUBLINGUAL DAILY
Status: ON HOLD | COMMUNITY
End: 2023-07-21 | Stop reason: HOSPADM

## 2023-07-18 RX ORDER — CLONIDINE HYDROCHLORIDE 0.1 MG/1
0.1 TABLET ORAL EVERY 6 HOURS PRN
Status: DISCONTINUED | OUTPATIENT
Start: 2023-07-18 | End: 2023-07-21 | Stop reason: HOSPADM

## 2023-07-18 RX ADMIN — HYDROXYZINE HYDROCHLORIDE 50 MG: 50 TABLET, FILM COATED ORAL at 13:26

## 2023-07-18 RX ADMIN — IBUPROFEN 400 MG: 400 TABLET, FILM COATED ORAL at 13:26

## 2023-07-18 RX ADMIN — HYDROXYZINE HYDROCHLORIDE 50 MG: 50 TABLET, FILM COATED ORAL at 21:07

## 2023-07-18 RX ADMIN — CLONIDINE HYDROCHLORIDE 0.1 MG: 0.1 TABLET ORAL at 21:07

## 2023-07-18 RX ADMIN — CLONIDINE HYDROCHLORIDE 0.1 MG: 0.1 TABLET ORAL at 13:25

## 2023-07-18 RX ADMIN — DICYCLOMINE HYDROCHLORIDE 20 MG: 20 TABLET ORAL at 13:25

## 2023-07-18 RX ADMIN — TRAZODONE HYDROCHLORIDE 50 MG: 50 TABLET ORAL at 21:07

## 2023-07-18 RX ADMIN — OLANZAPINE 5 MG: 5 TABLET, FILM COATED ORAL at 21:06

## 2023-07-18 ASSESSMENT — PAIN SCALES - GENERAL
PAINLEVEL_OUTOF10: 0
PAINLEVEL_OUTOF10: 10
PAINLEVEL_OUTOF10: 0
PAINLEVEL_OUTOF10: 0

## 2023-07-18 ASSESSMENT — PAIN DESCRIPTION - LOCATION: LOCATION: GENERALIZED

## 2023-07-18 NOTE — GROUP NOTE
Group Therapy Note    Date: 7/18/2023    Group Start Time: 1330  Group End Time: 0277  Group Topic: Psychoeducation    [facility]    VERO Batista    Group Therapy Note    Attendees: 10/18     Patient's Goal:  Patient will demonstrate improved interpersonal skills    Notes:  Patient attended group and participated    Status After Intervention:  Improved    Participation Level:  Active Listener and Interactive    Participation Quality: Appropriate, Attentive, Sharing      Speech:  normal      Thought Process/Content: Logical  Linear      Affective Functioning: constricted      Mood: dysphoric      Level of consciousness:  Alert, Oriented x4, and Attentive      Response to Learning: Able to verbalize current knowledge/experience, Able to verbalize/acknowledge new learning, Able to retain information      Endings: None Reported    Modes of Intervention: Education, Support, Socialization, and Exploration      Discipline Responsible: Psychoeducational Specialist      Signature:  Ivan Batista

## 2023-07-18 NOTE — BH NOTE
[facility]  Admission Note     Admission Type:   Admission Type: Voluntary    Reason for admission:  Reason for Admission: Presented in ED suicidal to OD on pills increased depression. off medications for one week, unstable living enviroment. Auditory hallucinations telling him to do drugs. Visual hallucinations of  chasing him. Addictive Behavior:   Addictive Behavior  In the Past 3 Months, Have You Felt or Has Someone Told You That You Have a Problem With  : None    Medical Problems:   Past Medical History:   Diagnosis Date    Bipolar disorder ([facility])     Depression     Substance abuse ([facility])        Status EXAM:  Mental Status and Behavioral Exam  Normal: No  Level of Assistance: Independent/Self  Facial Expression: Flat  Affect: Appropriate  Level of Consciousness: Alert  Frequency of Checks: 4 times per hour, close  Mood:Normal: No  Mood: Depressed, Anxious, Helpless  Motor Activity:Normal: Yes  Motor Activity: Decreased  Eye Contact: Fair  Observed Behavior: Cooperative, Friendly, Preoccupied  Sexual Misconduct History: Current - no  Preception: [place] to person, [place] to time, [place] to place, [place] to situation  Attention:Normal: No  Attention: Distractible  Thought Processes: Circumstantial  Thought Content:Normal: No  Thought Content: Preoccupations  Depression Symptoms: Sleep disturbance, Feelings of helplessness, Feelings of hopelessess  Anxiety Symptoms: Generalized  Dorie Symptoms: No problems reported or observed. Hallucinations:  Auditory (comment), Visual (comment) (voices telling him to do drugs, visual hallucinations of  chasing him)  Delusions: No  Memory:Normal: Yes  Insight and Judgment: No  Insight and Judgment: Poor judgment, Poor insight    Tobacco Screening:  Practical Counseling, on admission, gabriela X, if applicable and completed (first 3 are required if patient doesn't refuse):            ( ) Recognizing danger situations (included triggers and roadblocks)
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
## 2023-07-18 NOTE — GROUP NOTE
Group Therapy Note    Date: 7/18/2023    Group Start Time: 1430  Group End Time: 4356  Group Topic: Activity    [facility]    Merissa Rust RN        Group Therapy Note    Attendees: 8/18       Patient's Goal:  Stretching     Notes:  education on the benefit of body mechanics and health. Status After Intervention:  Improved    Participation Level: Interactive    Participation Quality: Attentive and Supportive      Speech:  normal      Thought Process/Content: Linear      Affective Functioning: Congruent      Mood: euthymic      Level of consciousness:  Oriented x4 and Attentive      Response to Learning: Progressing to goal      Endings: None Reported    Modes of Intervention: Socialization and Activity      Discipline Responsible: Registered Nurse      Signature:   Merissa Rust RN

## 2023-07-18 NOTE — PLAN OF CARE
Problem: Self Harm/Suicidality  Goal: Will have no self-injury during hospital stay  Description: INTERVENTIONS:  1. Ensure constant observer at bedside with Q15M safety checks  2. Maintain a safe environment  3. Secure patient belongings  4. Ensure family/visitors adhere to safety recommendations  5. Ensure safety tray has been added to patient's diet order  6. Every shift and PRN: Re-assess suicidal risk via Frequent Screener    7/18/2023 1434 by Shannon Christopher  Outcome: Progressing     Problem: Depression  Goal: Will be euthymic at discharge  Description: INTERVENTIONS:  1. Administer medication as ordered  2. Provide emotional support via 1:1 interaction with staff  3. Encourage involvement in milieu/groups/activities  4. Monitor for social isolation  7/18/2023 1434 by Shannon Christopher  Outcome: Progressing     Problem: Drug Abuse/Detox  Goal: Will have no detox symptoms and will verbalize plan for changing drug-related behavior  Description: INTERVENTIONS:  1. Administer medication as ordered  2. Monitor physical status  3. Provide emotional support with 1:1 interaction with staff  4. Encourage  recovery focused treatment   7/18/2023 1434 by Shannon Christopher  Outcome: Progressing  Note: Patient was uncooperative with daily assessment, and refused to answer questions writer wished to ask. Patient is alert and oriented X4. Patient is unable to concentrate during conversation. Patient's thought processes are unremarkable. Patient's memory is normal. Patient shows poor judgement and poor insight, and remains unmotivated. Writer was unable to assess patient's thoughts and feelings due to patient no wishing to answer any questions. Patient has not made any overt statements expressing that he is experiencing any thoughts of wanting to harm himself. Patient is focused on receiving suboxone to help him deal with his withdrawal symptoms.  When asked what withdrawal symptoms he is experiencing, patient

## 2023-07-18 NOTE — PROGRESS NOTES
Spoke with KERRY August at [facility] in [place], [place]. She reports the patient is getting Suboxone weekly. Last dispensed on 7/11/23. He's currently on 20 mg of Buprenorphine using two Suboxone 8/2 mg tablets and two Suboxone 2/0.5 mg tablets.

## 2023-07-18 NOTE — PROGRESS NOTES
Behavioral Services  Medicare Certification Upon Admission    I certify that this patient's inpatient psychiatric hospital admission is medically necessary for:    [x] (1) Treatment which could reasonably be expected to improve this patient's condition,       [x] (2) Or for diagnostic study;     AND     [x](2) The inpatient psychiatric services are provided while the individual is under the care of a physician and are included in the individualized plan of care.     Estimated length of stay/service 3-5 days    Plan for post-hospital care hc    Electronically signed by Virginia Young MD on 7/18/2023 at 4:05 PM

## 2023-07-18 NOTE — BH NOTE
Patient came to desk asking if orders were put in for Suboxone. Writer told patient there was not and that he would need to speak with the provider regarding any medications. Patient stated, \"So I'm supposed to just suffer with these withdrawals? \"  Writer asked patient what symptoms he was having, and patient replied, \"The kind you get from Suboxone withdrawal. Don't you know what those are?\" Writer explained to patient, that while, yes, I was aware of the symptoms, I would need him to tell me what specific symptoms he was having. Patient became irritable and walked back to room. Patient then approached desk again and stated he needed his Suboxone and wanted to know when the doctor would be in.  Patient was offered something for anxiety and patient stated, \"I'm not taking anything except my Subs\"
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
## 2023-07-18 NOTE — GROUP NOTE
Psych-Ed/Relapse Prevention Group Note        Date: July 18, 2023 Start Time: 11am  End Time: 11:45am      Number of Participants in Group & Unit Census:  9/18    Topic: Creative Expression    Goal of Group:Patient will identify benefits of creative expression for stress management and coping      Comments:     Patient did not participate in Psych-Ed/Relapse Prevention group, despite staff encouragement and explanation of benefits. Patient remain seclusive to self. Q15 minute safety checks maintained for patient safety and will continue to encourage patient to attend unit programming.          Signature:  Ivan Dewitt

## 2023-07-18 NOTE — PLAN OF CARE
[facility]  Initial Interdisciplinary Treatment Plan NO      Original treatment plan Date & Time: 7/18/23 1300    Admission Type:  Admission Type: Voluntary    Reason for admission:   Reason for Admission: Presented in ED suicidal to OD on pills increased depression. off medications for one week, unstable living enviroment. Auditory hallucinations telling him to do drugs. Visual hallucinations of  chasing him.     Estimated Length of Stay:  5-7days  Estimated Discharge Date: to be determined by physician    PATIENT STRENGTHS:  Patient Strengths:   Patient Strengths and Limitations:Limitations: Multiple barriers to leisure interests, External locus of control, Inappropriate/potentially harmful leisure interests, Tendency to isolate self, Difficulty problem solving/relies on others to help solve problems  Addictive Behavior: Addictive Behavior  In the Past 3 Months, Have You Felt or Has Someone Told You That You Have a Problem With  : None  Medical Problems:  Past Medical History:   Diagnosis Date    Bipolar disorder ([facility])     Depression     Substance abuse ([facility])      Status EXAM:Mental Status and Behavioral Exam  Normal: No  Level of Assistance: Independent/Self  Facial Expression: Flat  Affect: Appropriate  Level of Consciousness: Alert  Frequency of Checks: 4 times per hour, close  Mood:Normal: No  Mood: Depressed, Anxious, Helpless  Motor Activity:Normal: Yes  Motor Activity: Decreased  Eye Contact: Fair  Observed Behavior: Cooperative, Friendly, Preoccupied  Sexual Misconduct History: Current - no  Preception: [place] to person, [place] to time, [place] to place, [place] to situation  Attention:Normal: No  Attention: Distractible  Thought Processes: Circumstantial  Thought Content:Normal: No  Thought Content: Preoccupations  Depression Symptoms: Sleep disturbance, Feelings of helplessness, Feelings of hopelessess  Anxiety Symptoms: Generalized  Dorie Symptoms: No problems reported or

## 2023-07-18 NOTE — GROUP NOTE
Group Therapy Note    Date: 7/18/2023    Group Start Time: 1010  Group End Time: 1050  Group Topic: Psychoeducation    [address], LSW        Group Therapy Note    Attendees: 7/20       patient refused to attend psychoeducation group at 1010a after encouragement from staff.   1:1 talk time provided as alternative to group session

## 2023-07-19 PROCEDURE — 1240000000 HC EMOTIONAL WELLNESS R&B

## 2023-07-19 PROCEDURE — 6370000000 HC RX 637 (ALT 250 FOR IP): Performed by: PSYCHIATRY & NEUROLOGY

## 2023-07-19 PROCEDURE — APPSS60 APP SPLIT SHARED TIME 46-60 MINUTES: Performed by: PSYCHIATRY & NEUROLOGY

## 2023-07-19 PROCEDURE — 99231 SBSQ HOSP IP/OBS SF/LOW 25: CPT | Performed by: INTERNAL MEDICINE

## 2023-07-19 RX ORDER — SERTRALINE HYDROCHLORIDE 25 MG/1
25 TABLET, FILM COATED ORAL DAILY
Status: DISCONTINUED | OUTPATIENT
Start: 2023-07-19 | End: 2023-07-19

## 2023-07-19 RX ADMIN — IBUPROFEN 400 MG: 400 TABLET, FILM COATED ORAL at 08:19

## 2023-07-19 RX ADMIN — DICYCLOMINE HYDROCHLORIDE 20 MG: 20 TABLET ORAL at 08:20

## 2023-07-19 RX ADMIN — HALOPERIDOL 5 MG: 5 TABLET ORAL at 08:20

## 2023-07-19 RX ADMIN — OLANZAPINE 5 MG: 5 TABLET, FILM COATED ORAL at 20:59

## 2023-07-19 RX ADMIN — IBUPROFEN 400 MG: 400 TABLET, FILM COATED ORAL at 16:45

## 2023-07-19 RX ADMIN — HALOPERIDOL 5 MG: 5 TABLET ORAL at 20:30

## 2023-07-19 RX ADMIN — HYDROXYZINE HYDROCHLORIDE 50 MG: 50 TABLET, FILM COATED ORAL at 16:45

## 2023-07-19 RX ADMIN — LORAZEPAM 2 MG: 1 TABLET ORAL at 20:30

## 2023-07-19 RX ADMIN — LORAZEPAM 2 MG: 1 TABLET ORAL at 08:20

## 2023-07-19 RX ADMIN — SERTRALINE HYDROCHLORIDE 25 MG: 25 TABLET ORAL at 11:17

## 2023-07-19 ASSESSMENT — PAIN - FUNCTIONAL ASSESSMENT
PAIN_FUNCTIONAL_ASSESSMENT: ACTIVITIES ARE NOT PREVENTED
PAIN_FUNCTIONAL_ASSESSMENT: ACTIVITIES ARE NOT PREVENTED

## 2023-07-19 ASSESSMENT — PAIN SCALES - GENERAL
PAINLEVEL_OUTOF10: 4
PAINLEVEL_OUTOF10: 4
PAINLEVEL_OUTOF10: 2
PAINLEVEL_OUTOF10: 1

## 2023-07-19 ASSESSMENT — PAIN DESCRIPTION - LOCATION
LOCATION: GENERALIZED
LOCATION: GENERALIZED

## 2023-07-19 NOTE — GROUP NOTE
Group Therapy Note    Date: 7/18/2023    Group Start Time: 2030  Group End Time: 2057  Group Topic: Wrap-Up    MANAN Cedillo        Group Therapy Note    Attendees: 8/13       Patient's Goal:  stay sober and stay out of [facility]     Notes:  good participation     Status After Intervention:  Improved    Participation Level:  Active Listener    Participation Quality: Appropriate      Speech:  normal and hesitant      Thought Process/Content: Logical      Affective Functioning: Congruent Flat      Mood: anxious      Level of consciousness:  Alert      Response to Learning: Able to verbalize current knowledge/experience      Endings: None Reported    Modes of Intervention: Support      Discipline Responsible: [address]      Signature:  João Cedillo

## 2023-07-19 NOTE — PLAN OF CARE
Problem: Self Harm/Suicidality  Goal: Will have no self-injury during hospital stay  Description: INTERVENTIONS:  1. Ensure constant observer at bedside with Q15M safety checks  2. Maintain a safe environment  3. Secure patient belongings  4. Ensure family/visitors adhere to safety recommendations  5. Ensure safety tray has been added to patient's diet order  6. Every shift and PRN: Re-assess suicidal risk via Frequent Screener    7/18/2023 2002 by Concepcion Wilson RN  Outcome: Progressing  Note: Patient denies any thoughts to suicidal ideations and no signs of self harm were noted. Patient encouraged to inform staff if he starts to develop any thoughts to harm self. Patient voiced understanding. Q 15 minute checks done on pt for safety      Problem: Depression  Goal: Will be euthymic at discharge  Description: INTERVENTIONS:  1. Administer medication as ordered  2. Provide emotional support via 1:1 interaction with staff  3. Encourage involvement in milieu/groups/activities  4. Monitor for social isolation  7/18/2023 2002 by Concepcion Wilson RN  Outcome: Progressing  Note: Patient denies any depression. Patient is irritable and anxious. Patient has flat affect and avoidant gaze while talking with staff but is brightened and social in dayroom with select peers. Patient denies any thoughts to harm self. Patient encouraged to attend groups to learn coping skills. Q 15 minute checks done on pt for safety      Problem: Drug Abuse/Detox  Goal: Will have no detox symptoms and will verbalize plan for changing drug-related behavior  Description: INTERVENTIONS:  1. Administer medication as ordered  2. Monitor physical status  3. Provide emotional support with 1:1 interaction with staff  4. Encourage  recovery focused treatment   7/18/2023 2002 by Concepcion Wilson RN  Outcome: Progressing  Note: Patient asked about his withdrawal symptoms. Patient stated \"I just feel miserable. \" Patient was asked again about specific symptoms. Patient said, \"well, I'm starting to sweat. I just want you to give me any medication you can. \" Writer informed patient, \"I can't give you medications for withdrawal unless I know the specific symptoms you are having so I can treat accordingly. \" Patient responded irritated and agitated, \"Nevermind. I'm fine. \" Patient then turned away from writer and ended conversation.  Q 15 minute checks done on pt for safety

## 2023-07-19 NOTE — PLAN OF CARE
Problem: Drug Abuse/Detox  Goal: Will have no detox symptoms and will verbalize plan for changing drug-related behavior  Description: INTERVENTIONS:  1. Administer medication as ordered  2. Monitor physical status  3. Provide emotional support with 1:1 interaction with staff  4. Encourage  recovery focused treatment   Outcome: Progressing  Note: Patient is attempting to cope and gain insight towards his negative behaviors. Problem: Self Harm/Suicidality  Goal: Will have no self-injury during hospital stay  Description: INTERVENTIONS:  1. Ensure constant observer at bedside with Q15M safety checks  2. Maintain a safe environment  3. Secure patient belongings  4. Ensure family/visitors adhere to safety recommendations  5. Ensure safety tray has been added to patient's diet order  6. Every shift and PRN: Re-assess suicidal risk via Frequent Screener    Note: Patient denies suicidal/homicidal ideations but reports feeling irritable, highly anxious and depressed but redirectable. Patient is observed consistently pacing with increased irritability and tears. He complained of stomach cramps and general body aches. PRNs administered and helpful with patient being able to relax and rest without distress. Safet checks will continue to be conducted Q15 minutes and as needed. Problem: Depression  Goal: Will be euthymic at discharge  Description: INTERVENTIONS:  1. Administer medication as ordered  2. Provide emotional support via 1:1 interaction with staff  3. Encourage involvement in milieu/groups/activities  4. Monitor for social isolation  Note: Patient is attempting to progress towards goal.     Problem: Pain  Goal: Verbalizes/displays adequate comfort level or baseline comfort level  Note: Patient complained of general discomfort and exhibited mild distress.

## 2023-07-19 NOTE — H&P
[facility] Internal Medicine  Damaris Jarquin MD; Diallo Katz MD; Penelope Presley MD; MD Wayne Schultz MD; Janae Salazar MD    [facility] Internal Medicine   [address] [place] [address]    HISTORY AND PHYSICAL EXAMINATION            Date:   7/18/2023  Patient name:  Bull Gimenez  Date of admission:  7/17/2023  7:06 PM  MRN:   [id number]  Account:  [de-identified]  YOB: 1990  PCP:    No primary care provider on file. Room:   [address] [place] [address]  Code Status:    Full Code    Chief Complaint:     Chief Complaint   Patient presents with    Suicidal   Depression suicidal ideation    History Obtained From:     Patient medical record nursing staff    History of Present Illness:     Bull Gimenez is a 33 y.o. Unavailable / unknown male who presents with Suicidal   and is admitted to the hospital for the management of Major depressive disorder, recurrent, severe with psychotic features ([address] [place] [address]). 73-year-old gentleman with a history of opioid abuse on methadone history of depression denies any fever chills no nausea vomiting diarrhea no significant weight loss or weight gain        Past Medical History:     Past Medical History:   Diagnosis Date    Bipolar disorder ([address] [place] [address])     Depression     Substance abuse ([address] [place] [address])         Past Surgical History:     No past surgical history on file. Medications Prior to Admission:     Prior to Admission medications    Medication Sig Start Date End Date Taking? Authorizing Provider   buprenorphine-naloxone (SUBOXONE) 4-1 MG FILM SL film Place 1 Film under the tongue daily. Max Daily Amount: 1 Film   Yes Historical Provider, MD   OLANZapine (ZYPREXA) 5 MG tablet Take 1 tablet by mouth nightly  Patient taking differently: Take 4 tablets by mouth nightly 4/13/22   Osmin Gil MD   methadone 10 MG/5ML solution Take 75 mLs by mouth every 24 hours.   Patient not taking: Reported on 7/18/2023    Historical Provider, MD time  Head: normocephalic, atraumatic  Eye: no icterus, redness, pupils equal and reactive, extraocular eye movements intact, conjunctiva clear  Ear: normal external ear, no discharge, hearing intact  Nose: no drainage noted  Mouth: mucous membranes moist  Neck: supple, no carotid bruits, thyroid not palpable  Lungs: Bilateral equal air entry, clear to ausculation, no wheezing, rales or rhonchi, normal effort  Cardiovascular: normal rate, regular rhythm, no murmur, gallop, rub  Abdomen: Soft, nontender, nondistended, normal bowel sounds, no hepatomegaly or splenomegaly  Neurologic: There are no new focal motor or sensory deficits, normal muscle tone and bulk, no abnormal sensation, normal speech, cranial nerves II through XII grossly intact  Skin: No gross lesions, rashes, bruising or bleeding on exposed skin area  Extremities: peripheral pulses palpable, no pedal edema or calf pain with palpation      Investigations:      Laboratory Testing:  No results found for this or any previous visit (from the past 24 hour(s)). Imaging/Diagnostics:  No results found. Assessment :      Hospital Problems             Last Modified POA    * (Principal) Major depressive disorder, recurrent, severe with psychotic features ([address]) 7/18/2023 Yes    Acute psychosis ([address]) 7/18/2023 Yes       Plan:     28-year-old gentleman with a history of opiate dependence on methadone suffers from major depressive disorder  Medications Labs reviewed we will order TSH with reflex  Agree with the above medical management        La Nena Grijalva MD  7/18/2023  9:39 PM    Copy sent to Dr. Terry Nick primary care provider on file. Please note that this chart was generated using voice recognition Dragon dictation software. Although every effort was made to ensure the accuracy of this automated transcription, some errors in transcription may have occurred.

## 2023-07-19 NOTE — BH NOTE
Emergency Medication Follow-Up Note:    PRN medication of oral ativan 2mg, haldol 5mg was effective as evidence by Patient regain behavioral control, absence of behavior. Patient denies medication side effects. Will continue to monitor and provide support as needed.

## 2023-07-19 NOTE — PROGRESS NOTES
Daily Progress Note  7/19/2023    Patient Name: Celia Forrest: Suicidal ideation with plan to overdose         SUBJECTIVE:      Patient is seen today for a follow up assessment. Patient was seen for follow-up assessment in his room today. Nursing staff report patient has been medication adherent. He remains very focused on his Suboxone although has been medication compliant and has taken olanzapine 5 mg last evening and Zoloft 25 mg this morning. He denies GI distress or other medications side effects. He reports he has been spending much of his time in his room lying down and has not felt like attending groups. He denies auditory and visual hallucinations and made no delusional or paranoid statements during conversation. At this time patient is unable to contract for safety in the community and warrants further hospitalization for stabilization, medication management, and therapeutic groups and milieu.     Appetite:  [x] Adequate/Unchanged  [] Increased  [] Decreased      Sleep:       [x] Adequate/Unchanged  [] Fair  [] Poor      Group Attendance on Unit:   [] Yes   [] Selectively    [x] No    Compliant with scheduled medications: [x] Yes  [] No    Received emergency medications in past 24 hrs: [] Yes   [x] No    Medication Side Effects: Denies         Mental Status Exam  Level of consciousness: Alert and awake   Appearance: Appropriate attire for setting, resting in bed, with disheveled grooming and fair hygiene, room is malodorous  Behavior/Motor: Approachable, mildly restless  Attitude toward examiner: Cooperative, attentive, fair eye contact   Speech: spontaneous, normal rate and normal volume   Mood: Depressed, anxious  Affect: Mood congruent  Thought processes: linear and coherent  Thought content: Denies homicidal ideation  Suicidal Ideation: Present, unable to contract for safety in community  Delusions: Not evident  Perceptual Disturbance: patient is not observed responding to psychotic features ([address])         PATIENT HANDOFF  Patient symptoms are: showing modest improvement  Monitor need and frequency of PRN medications. Encourage participation in groups and milieu. Per nursing team mother has requested that patient be considered for electroconvulsive therapy. Will provide outpatient information and encourage patient to consider attending alcohol and other drug treatment once his symptoms are stabilized. Medication changes and discharge planning per attending  Follow-up daily while inpatient. Patient continues to be monitored in the inpatient psychiatric facility at [facility] for safety and stabilization. Patient continues to need, on a daily basis, active treatment furnished directly by or requiring the supervision of inpatient psychiatric personnel. Electronically signed by JAZMINE Colon CNP on 7/19/2023 at 3:29 PM    **This report has been created using voice recognition software. It may contain minor errors which are inherent in voice recognition technology. **                                          Psychiatry Attending Attestation     I independently saw and evaluated the patient. I reviewed the Advance Practice Provider's documentation above. Any additional comments or changes to the Advance Practice Provider's documentation are stated below otherwise agree with assessment. Patient was somewhat hyperverbal here on the unit and is dismissive. Irritable stating that he is going through withdrawals. Mentions that there is improvement in his suicidal thoughts. Discussed again about the possibility of going to a sober living facility and he was not interested in this plan. He is utilizing as needed medications to help with the withdrawals. Currently reports withdrawal symptoms as feeling tired diaphoresis and muscle cramps. Social work indicated that his mother is his legal guardian.   Legal guardian indicated that he be transferred to a state hospital or

## 2023-07-19 NOTE — CARE COORDINATION
Pt's mom presented temporary legal guardianship paperwork granted by [address] [place] [address] called [facility] and verified that temporary guardianship is active and there is a court hearing on 7/31/2023 to rajan full guardianship. Consents resigned by [employer]. Copy of temporary LG paperwork placed in pt's chart along with updated consents. MD notified.

## 2023-07-19 NOTE — BH NOTE
Emergency PRN Medication Administration Note:      Patient is Agitated as evidence by consistent pacing with increased. respirations and tears Staff attempted to find and relieve the distress by Talking to patient, offering food & fluids and encouraging rest.  Patient is currently accepting of PRN oral ativan 2mg, haldol 5mg as prescribed: Patient Tolerated medication administration. Will continue to monitor, offer support, and reassess.
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
## 2023-07-19 NOTE — GROUP NOTE
Psych-Ed/Relapse Prevention Group Note        Date: July 19, 2023 Start Time: 11am  End Time: 11:45am      Number of Participants in Group & Unit Census:  6/10    Topic: Social support and socialization    Goal of Group:Patient will identify benefits of social support and how to improve socialization and build support. Comments:     Patient did not participate in Psych-Ed/Relapse Prevention group, despite staff encouragement and explanation of benefits. Patient remain seclusive to self. Q15 minute safety checks maintained for patient safety and will continue to encourage patient to attend unit programming.          Signature:  Chikis Montero

## 2023-07-20 PROCEDURE — 6370000000 HC RX 637 (ALT 250 FOR IP): Performed by: PSYCHIATRY & NEUROLOGY

## 2023-07-20 PROCEDURE — APPSS30 APP SPLIT SHARED TIME 16-30 MINUTES: Performed by: PSYCHIATRY & NEUROLOGY

## 2023-07-20 PROCEDURE — 1240000000 HC EMOTIONAL WELLNESS R&B

## 2023-07-20 RX ADMIN — OLANZAPINE 5 MG: 5 TABLET, FILM COATED ORAL at 21:15

## 2023-07-20 RX ADMIN — HYDROXYZINE HYDROCHLORIDE 50 MG: 50 TABLET, FILM COATED ORAL at 21:15

## 2023-07-20 RX ADMIN — IBUPROFEN 400 MG: 400 TABLET, FILM COATED ORAL at 11:24

## 2023-07-20 RX ADMIN — HYDROXYZINE HYDROCHLORIDE 50 MG: 50 TABLET, FILM COATED ORAL at 11:24

## 2023-07-20 RX ADMIN — SERTRALINE 50 MG: 50 TABLET, FILM COATED ORAL at 11:25

## 2023-07-20 RX ADMIN — TRAZODONE HYDROCHLORIDE 50 MG: 50 TABLET ORAL at 21:15

## 2023-07-20 RX ADMIN — DICYCLOMINE HYDROCHLORIDE 20 MG: 20 TABLET ORAL at 11:24

## 2023-07-20 RX ADMIN — LORAZEPAM 2 MG: 1 TABLET ORAL at 13:03

## 2023-07-20 RX ADMIN — HALOPERIDOL 5 MG: 5 TABLET ORAL at 13:03

## 2023-07-20 NOTE — BH NOTE
Emergency PRN Medication Administration Note:      Patient is Agitated as evidence by increased irritability, inability to relax or focus with pressured speech. Staff attempted to find and relieve the distress by Offering suggestions Patient is currently  accepted PRN medications. Medication Administered as prescribed: oral ativan 2mg, haldol 5mg Patient Tolerated medication administration. Will continue to monitor, offer support, and reassess.

## 2023-07-20 NOTE — GROUP NOTE
Group Therapy Note    Date: 7/20/2023    Group Start Time: 1330  Group End Time: 1647  Group Topic: Psycheducation Group     MANAN George Erm, CTRS        Group Therapy Note    Attendees 8/13       Patient's Goal:  pt will demonstrate improved communication skills and improved coping skills     Notes:   pt was pleasant and participated well    Status After Intervention:  Improved    Participation Level:  Active Listener and Interactive    Participation Quality: Appropriate,     Speech:  normal      Thought Process/Content: Logical      Affective Functioning: restricted       Mood: gaurded, superficial      Level of consciousness:  Alert      Response to Learning: Able to verbalize current knowledge/experience and Progressing to goal      Endings: None Reported    Modes of Intervention: Support, Socialization, and Activity      Discipline Responsible Psycheducation Specialist       Signature:  MAGGIE Banuelos

## 2023-07-20 NOTE — GROUP NOTE
Group Therapy Note    Date: 7/20/2023    Group Start Time: 1000  Group End Time: 5440  Group Topic: Psychotherapy    [facility]    RHEA Jones        Group Therapy Note    Attendees: 6/13       Patient was offered group therapy today but declined to participate despite encouragement from staff. 1:1 was offered.       Signature:  RHEA Jones

## 2023-07-20 NOTE — PROGRESS NOTES
Daily Progress Note  7/20/2023    Patient Name: Morgan Si: Suicidal ideation with plan to overdose         SUBJECTIVE:      Patient is seen today for a follow up assessment. Patient was seen for follow-up assessment. Criselda Arrington has maintained medication adherence. He has utilized Haldol and Ativan last evening at 2030 and this afternoon at 1303. He is visible on the unit and extremely discharge focused. He has little insight into his legal guardians insistence that he attend alcohol and other drug treatment and states \"I am just going to go home and everything will be fine\". He has been provided electroconvulsive therapy pamphlet and verbalizes understanding that it is important to be free of alcohol and illicit drugs prior to a consultation. Natalia Cote is encouraged to participate in group programming consistently and remain in contact with his mother to discuss his options regarding residential rehabilitation programs once his symptoms are fully stabilized.     Appetite:  [x] Adequate/Unchanged  [] Increased  [] Decreased      Sleep:       [x] Adequate/Unchanged  [] Fair  [] Poor      Group Attendance on Unit:   [] Yes   [x] Selectively    [] No    Compliant with scheduled medications: [x] Yes  [] No    Received emergency medications in past 24 hrs: [x] Yes   [] No    Medication Side Effects: Denies         Mental Status Exam  Level of consciousness: Alert and awake   Appearance: Appropriate attire for setting, resting in bed, with disheveled grooming and fair hygiene, room is malodorous  Behavior/Motor: Approachable, mildly restless  Attitude toward examiner: Cooperative, attentive, fair eye contact   Speech: spontaneous, normal rate and normal volume   Mood: Depressed, anxious  Affect: Mood congruent  Thought processes: linear and coherent  Thought content: Denies homicidal ideation  Suicidal Ideation: Present, unable to contract for safety in community  Delusions: Not

## 2023-07-20 NOTE — BH NOTE
Emergency Medication Follow-Up Note:    PRN medication of Haldol 5mg PO and Ativan 2mg PO was effective as evidence by patient has regained behavioral control, is not threatening staff, and has been out socializing with peers. Patient denies medication side effects. Will continue to monitor and provide support as needed.

## 2023-07-20 NOTE — BH NOTE
Emergency Medication Follow-Up Note:  Oral ativan 2mg, haldol 5mg was effective as evidence by patient resting calmly in bed without distress. Patient regain behavioral control, absence of behavior warranting emergency medication, socializing with peers. Patient denies medication side effects. Will continue to monitor and provide support as needed.

## 2023-07-20 NOTE — PLAN OF CARE
Problem: Self Harm/Suicidality  Goal: Will have no self-injury during hospital stay  Description: INTERVENTIONS:  1. Ensure constant observer at bedside with Q15M safety checks  2. Maintain a safe environment  3. Secure patient belongings  4. Ensure family/visitors adhere to safety recommendations  5. Ensure safety tray has been added to patient's diet order  6. Every shift and PRN: Re-assess suicidal risk via Frequent Screener    7/20/2023 1626 by Gabrielle Hester LPN  Note: Patient denies suicidal/homicidal ideations but reports feeling depressed and anxious. Patient is irritable, agitated and discharged focused with difficulty to incorporate coping skills or provide other activities, oral PRN meds administered. Patient is aloof of others and compliant with medication regime. Programming encouraged. Problem: Depression  Goal: Will be euthymic at discharge  Description: INTERVENTIONS:  1. Administer medication as ordered  2. Provide emotional support via 1:1 interaction with staff  3. Encourage involvement in milieu/groups/activities  4. Monitor for social isolation  7/20/2023 1626 by Gabrielle Hester LPN  Note: Patient is attempting to progress towards goal.     Problem: Pain  Goal: Verbalizes/displays adequate comfort level or baseline comfort level  7/20/2023 1626 by Gabrielle Hester LPN  Note: Patient denies aany discomfort and exhibits no pain.

## 2023-07-20 NOTE — GROUP NOTE
Group Therapy Note    Date: 7/20/2023    Group Start Time: 1100  Group End Time: 2931  Group Topic: cognitive skills     [facility]        Group Therapy Note    Attendees 7/13       Patient's Goal:  pt will demonstrate improved coping skills and improved concentration     Notes:   pt was pleasant and participated well    Status After Intervention:  Improved    Participation Level:  Active Listener and Interactive    Participation Quality: Appropriate, Attentive, Sharing, and Supportive      Speech:  normal      Thought Process/Content: Logical      Affective Functioning: Congruent      Mood: euthymic      Level of consciousness:  Alert      Response to Learning: Able to verbalize current knowledge/experience and Progressing to goal      Endings: None Reported    Modes of Intervention: Support, Socialization, and Activity      Discipline Responsible Psycheducation Specialist       Signature:  MAGGIE Moscoso

## 2023-07-20 NOTE — PLAN OF CARE
[facility]  Day 3 Interdisciplinary Treatment Plan NOTE    Review Date & Time: 7/20/2023   1300    Admission Type:   Admission Type: Voluntary    Reason for admission:  Reason for Admission: Presented in ED suicidal to OD on pills increased depression. off medications for one week, unstable living enviroment. Auditory hallucinations telling him to do drugs. Visual hallucinations of  chasing him.   Estimated Length of Stay:  5-7 days  Estimated Discharge Date Update:   to be determined by physician    PATIENT STRENGTHS:  Patient Strengths:   Patient Strengths and Limitations:Limitations: Multiple barriers to leisure interests, External locus of control, Inappropriate/potentially harmful leisure interests, Tendency to isolate self, Difficulty problem solving/relies on others to help solve problems  Addictive Behavior:Addictive Behavior  In the Past 3 Months, Have You Felt or Has Someone Told You That You Have a Problem With  : None  Medical Problems:  Past Medical History:   Diagnosis Date    Bipolar disorder ([address])     Depression     Substance abuse ([address])        Risk:  Fall Risk   Avery Scale Avery Scale Score: 22  BVC    Change in scores:  No Changes to plan of Care:  No    Status EXAM:   Mental Status and Behavioral Exam  Normal: No  Level of Assistance: Independent/Self  Facial Expression: Hostile  Affect: Appropriate  Level of Consciousness: Alert  Frequency of Checks: 4 times per hour, close  Mood:Normal: No  Mood: Anxious, Angry, Irritable  Motor Activity:Normal: Yes  Motor Activity: Increased  Eye Contact: Poor  Observed Behavior: Preoccupied, Hostile, Agitated  Sexual Misconduct History: Current - no  Preception: [place] to time, [place] to place, [place] to person  Attention:Normal: No  Attention: Unable to concentrate  Thought Processes: Unremarkable  Thought Content:Normal: No  Thought Content: Preoccupations  Depression Symptoms: Impaired concentration, Increased irritability  Anxiety

## 2023-07-20 NOTE — BH NOTE
Pt is agitated as evidence by threatening to throw chairs and \"everything up in this bitch. \" Patient stating he is agitated and to \"get the whole team down here because I'm not afraid to go off. \" Staff attempted to find and relieve the distress by talking to pt, refocusing on new activity, offering suggestions, checking for undiagnosed pain. Patient given PRN PO Ativan and Haldol. Pt is currently out in day area. Will continue to monitor.

## 2023-07-20 NOTE — PLAN OF CARE
Problem: Self Harm/Suicidality  Goal: Will have no self-injury during hospital stay  Description: INTERVENTIONS:  1. Ensure constant observer at bedside with Q15M safety checks  2. Maintain a safe environment  3. Secure patient belongings  4. Ensure family/visitors adhere to safety recommendations  5. Ensure safety tray has been added to patient's diet order  6. Every shift and PRN: Re-assess suicidal risk via Frequent Screener    7/19/2023 2357 by Rob Butler RN  Outcome: Progressing     Problem: Depression  Goal: Will be euthymic at discharge  Description: INTERVENTIONS:  1. Administer medication as ordered  2. Provide emotional support via 1:1 interaction with staff  3. Encourage involvement in milieu/groups/activities  4. Monitor for social isolation  7/19/2023 2357 by Rob Butler RN  Outcome: Progressing  Note: Patient denies     Problem: Pain  Goal: Verbalizes/displays adequate comfort level or baseline comfort level  7/19/2023 2357 by Rob Butler RN  Outcome: Progressing     Problem: Drug Abuse/Detox  Goal: Will have no detox symptoms and will verbalize plan for changing drug-related behavior  Description: INTERVENTIONS:  1. Administer medication as ordered  2. Monitor physical status  3. Provide emotional support with 1:1 interaction with staff  4. Encourage  recovery focused treatment   7/19/2023 2357 by Rob Butler RN  Outcome: Not Progressing  Note: Patient stated he felt like he was going to throw up during assessment. He appeared to be pale when lying in bed. He denied feelings of depression and stated his anxiety was 5/10. Patient came up later in the shift stating directly that he needs the Haldol and Ativan. I educated patient that the medication is not used for withdrawal but I can give him Catapress which is prescribed for the withdrawal symtoms. Patient then starting yelling at staff saying he will flip out and throw things.    7/19/2023 1018 by Jill Villarreal

## 2023-07-21 VITALS
TEMPERATURE: 97.9 F | OXYGEN SATURATION: 100 % | DIASTOLIC BLOOD PRESSURE: 69 MMHG | SYSTOLIC BLOOD PRESSURE: 131 MMHG | HEIGHT: 72 IN | HEART RATE: 67 BPM | RESPIRATION RATE: 14 BRPM | WEIGHT: 180 LBS | BODY MASS INDEX: 24.38 KG/M2

## 2023-07-21 PROCEDURE — 6370000000 HC RX 637 (ALT 250 FOR IP): Performed by: PSYCHIATRY & NEUROLOGY

## 2023-07-21 RX ORDER — TRAZODONE HYDROCHLORIDE 50 MG/1
50 TABLET ORAL NIGHTLY PRN
Qty: 30 TABLET | Refills: 0 | Status: SHIPPED | OUTPATIENT
Start: 2023-07-21

## 2023-07-21 RX ORDER — OLANZAPINE 5 MG/1
5 TABLET ORAL NIGHTLY
Qty: 30 TABLET | Refills: 3 | Status: SHIPPED | OUTPATIENT
Start: 2023-07-21

## 2023-07-21 RX ORDER — HYDROXYZINE 50 MG/1
50 TABLET, FILM COATED ORAL 3 TIMES DAILY PRN
Qty: 90 TABLET | Refills: 0 | Status: SHIPPED | OUTPATIENT
Start: 2023-07-21 | End: 2023-08-20

## 2023-07-21 RX ADMIN — SERTRALINE 50 MG: 50 TABLET, FILM COATED ORAL at 08:22

## 2023-07-21 NOTE — BH NOTE
[facility]  Discharge Note    Patient discharged to [facility] [address] via private vehicle driven by family. Pt discharged with followings belongings:   Dental Appliances: None  Vision - Corrective Lenses: Eyeglasses  Hearing Aid: None  Jewelry: None  Body Piercings Removed: N/A  Clothing: Shorts, Shirt, Footwear, Undergarments  Other Valuables: Other (Comment) (na)   Patient had no valuables. Patient educated on aftercare instructions: Yes. Information faxed to [address] [place] [address] by staff  at 3:47 PM .Patient verbalize understanding of AVS:  Yes. Status EXAM upon discharge:  Mental Status and Behavioral Exam  Normal: No  Level of Assistance: Independent/Self  Facial Expression: Flat  Affect: Blunt  Level of Consciousness: Alert  Frequency of Checks: 4 times per hour, close  Mood:Normal: No  Mood: Anxious  Motor Activity:Normal: Yes  Motor Activity: Increased  Eye Contact: Fair  Observed Behavior: Preoccupied  Sexual Misconduct History: Current - no  Preception: [place] to person, [place] to time, [place] to place, [place] to situation  Attention:Normal: No  Attention: Unable to concentrate  Thought Processes: Circumstantial  Thought Content:Normal: No  Thought Content: Preoccupations  Depression Symptoms: No problems reported or observed. Anxiety Symptoms: Generalized  Dorie Symptoms: No problems reported or observed.   Hallucinations: None  Delusions: No  Memory:Normal: Yes  Insight and Judgment: No  Insight and Judgment: Poor judgment, Poor insight    Tobacco Screening:  Practical Counseling, on admission, gabriela X, if applicable and completed (first 3 are required if patient doesn't refuse):            ( ) Recognizing danger situations (included triggers and roadblocks)                    ( ) Coping skills (new ways to manage stress,relaxation techniques, changing routine, distraction)                                                           ( ) Basic information about quitting

## 2023-07-21 NOTE — GROUP NOTE
Group Therapy Note    Date: 7/21/2023    Group Start Time: 1430  Group End Time: 1500  Group Topic: Psychotherapy    [facility], RHEA        Group Therapy Note    Attendees: 6/13       Patient was offered group therapy today but declined to participate despite encouragement from staff. 1:1 was offered.       Signature:  RHEA Meneses

## 2023-07-21 NOTE — BH NOTE
Writer called to notify Rick Julien ([facility]) that patient was discharged and left our facility. AVS was faxed as well.
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
## 2023-07-21 NOTE — PROGRESS NOTES
CLINICAL PHARMACY NOTE: MEDS TO BEDS    Total # of Prescriptions Filled: 4   The following medications were delivered to the patient:  Hydrozyzine HCL 50mg  Trazodone HCL 50mg  Sertraline HCL 50mg  Olanzapine 5mg    Additional Documentation: Picked up in pharmacy by nurse07/21/23 Brian Guerin

## 2023-07-21 NOTE — PLAN OF CARE
Problem: Self Harm/Suicidality  Goal: Will have no self-injury during hospital stay  Description: INTERVENTIONS:  1. Ensure constant observer at bedside with Q15M safety checks  2. Maintain a safe environment  3. Secure patient belongings  4. Ensure family/visitors adhere to safety recommendations  5. Ensure safety tray has been added to patient's diet order  6. Every shift and PRN: Re-assess suicidal risk via Frequent Screener    7/21/2023 0136 by Francisco Lomeli RN  Outcome: Progressing     Problem: Depression  Goal: Will be euthymic at discharge  Description: INTERVENTIONS:  1. Administer medication as ordered  2. Provide emotional support via 1:1 interaction with staff  3. Encourage involvement in milieu/groups/activities  4. Monitor for social isolation  7/21/2023 0136 by Francisco Lomeli RN  Outcome: Progressing     Problem: Drug Abuse/Detox  Goal: Will have no detox symptoms and will verbalize plan for changing drug-related behavior  Description: INTERVENTIONS:  1. Administer medication as ordered  2. Monitor physical status  3. Provide emotional support with 1:1 interaction with staff  4. Encourage  recovery focused treatment   7/21/2023 0136 by Francisco Lomeli RN  Outcome: Progressing     Problem: Pain  Goal: Verbalizes/displays adequate comfort level or baseline comfort level  7/21/2023 0136 by Francisco Lomeli RN  Outcome: Progressing     Patient is irritable on approach, guarded, depressed, states \"I'm here\" upon assessment of mood. Patient denies any thoughts of self harm, no symptoms of detox. noted or verbalized by patient. Patient is medication compliant, spoke to mom on phone and verbalizes being receptive to going to residential rehab. facility in [facility] upon discharge. Patient denies any pain, will continue to monitor and provided intervention as needed.

## 2023-07-21 NOTE — CARE COORDINATION
Name: Elida Diaz    : 1990    Discharge Date: 23    Primary Auth/Cert #: [id number]    Destination: [address]    Discharge Medications:      Medication List        START taking these medications      hydrOXYzine HCl 50 MG tablet  Commonly known as: ATARAX  Take 1 tablet by mouth 3 times daily as needed for Anxiety  Notes to patient: Anxiety     sertraline 50 MG tablet  Commonly known as: ZOLOFT  Take 1 tablet by mouth daily  Start taking on: 2023  Notes to patient: Mood stabilizer      traZODone 50 MG tablet  Commonly known as: DESYREL  Take 1 tablet by mouth nightly as needed for Sleep  Notes to patient: Sleep            CHANGE how you take these medications      OLANZapine 5 MG tablet  Commonly known as: ZYPREXA  Take 1 tablet by mouth nightly  What changed: how much to take  Notes to patient: Mood stabilizer             STOP taking these medications      methadone 10 MG/5ML solution     Suboxone 4-1 MG Film SL film  Generic drug: buprenorphine-naloxone               Where to Get Your Medications        These medications were sent to [facility] [address] [facility], [address] [place] [address]      Phone: [phone]   hydrOXYzine HCl 50 MG tablet  OLANZapine 5 MG tablet  sertraline 50 MG tablet  traZODone 50 MG tablet         Follow Up Appointment: [address], [address] [place] [address]   [phone]  Go on 2023  All services provided at facility    Please call [facility] Suarez ([phone]) when patient has been picked up by cab.

## 2023-07-21 NOTE — GROUP NOTE
Group Therapy Note    Date: 7/21/2023    Group Start Time: 1100  Group End Time: 5956  Group Topic: Psychoeducation    [facility]    Group Therapy Note    Attendees: 9/16       Patient's Goal:  Patient will identify healthy coping skills and create a safety plan    Notes:  Patient attended group and participated    Status After Intervention:  Improved    Participation Level:  Active Listener and Interactive    Participation Quality: Appropriate, Attentive, Sharing, and Supportive      Speech:  normal      Thought Process/Content: Logical  Linear      Affective Functioning: Constricted/Restricted      Mood: Euthymic      Level of consciousness:  Alert, Oriented x4, and Attentive      Response to Learning: Able to verbalize current knowledge/experience, Able to verbalize/acknowledge new learning, Able to retain information, Capable of insight, Able to change behavior, and Progressing to goal      Endings: None Reported    Modes of Intervention: Education, Support, Socialization, and Exploration      Discipline Responsible: Psychoeducational Specialist      Signature:  Ivan Ledezma

## 2023-07-21 NOTE — GROUP NOTE
Group Therapy Note    Date: 7/21/2023    Group Start Time: 1330  Group End Time: 0862  Group Topic: Psychoeducation    [facility]    VERO Farris    Group Therapy Note    Attendees: 7/13     Patient's Goal:  Patient will identify benefits of leisure and music for coping and stress management    Notes:  Patient attended group and participated    Status After Intervention:  Improved    Participation Level:  Active Listener and Interactive    Participation Quality: Appropriate and Attentive      Speech:  normal      Thought Process/Content: Logical  Linear      Affective Functioning: Congruent      Mood: anxious      Level of consciousness:  Alert, Oriented x4, and Attentive      Response to Learning: Able to verbalize current knowledge/experience, Able to verbalize/acknowledge new learning, Able to retain information    Endings: None Reported    Modes of Intervention: Education, Support, Socialization, and Exploration      Discipline Responsible: Psychoeducational Specialist      Signature:  Ivan Farris

## 2023-07-21 NOTE — CARE COORDINATION
CHERIE spoke to pt's [facility] (phone # [phone]) regarding disposition at discharge. MYRTLE states that pt has a bed reserved at [facility]. CHERIE spoke to Jama from [address] [place] [address] (phone # [phone]) regarding discharge planning for pt.  Jama confirmed reserved bed and states pt can admit any time before 11PM.

## 2023-07-21 NOTE — DISCHARGE INSTR - DIET

## 2023-07-21 NOTE — GROUP NOTE
Group Therapy Note    Date: 7/21/2023    Group Start Time: 1000  Group End Time: 1030  Group Topic: Cognitive Skills    [facility]    Renu Agosto RN        Group Therapy Note    Attendees: 7/15     Patient's Goal:  Interact with peers appropriately and work on socialization skills. Notes:  Trivia group    Status After Intervention:  Improved    Participation Level:  Active Listener and Interactive    Participation Quality: Appropriate, Attentive, Sharing, and Supportive      Speech:  normal      Thought Process/Content: Logical  Linear      Affective Functioning: Congruent      Mood: euthymic      Level of consciousness:  Alert, Oriented x4, and Attentive      Response to Learning: Able to verbalize current knowledge/experience      Endings: None Reported    Modes of Intervention: Socialization and Activity      Discipline Responsible: Registered Nurse      Signature:  Renu Agosto RN